# Patient Record
Sex: FEMALE | Race: WHITE | NOT HISPANIC OR LATINO | Employment: UNEMPLOYED | URBAN - METROPOLITAN AREA
[De-identification: names, ages, dates, MRNs, and addresses within clinical notes are randomized per-mention and may not be internally consistent; named-entity substitution may affect disease eponyms.]

---

## 2017-09-01 ENCOUNTER — TELEPHONE (OUTPATIENT)
Dept: NEUROLOGY | Facility: CLINIC | Age: 52
End: 2017-09-01

## 2017-09-01 NOTE — TELEPHONE ENCOUNTER
----- Message from Caden Tomlinson sent at 8/31/2017  1:00 PM CDT -----  Contact: self   [phone]  Patient has medicaid insurance, requesting to schedule an appointment for pain management. No future appointments.

## 2017-09-21 ENCOUNTER — TELEPHONE (OUTPATIENT)
Dept: NEUROLOGY | Facility: CLINIC | Age: 52
End: 2017-09-21

## 2017-09-21 NOTE — TELEPHONE ENCOUNTER
Called and spoke with patient. Patient has Medicaid. Numbers given to [facility]. Patient verbalized understanding.

## 2019-09-19 PROBLEM — Z12.11 SCREEN FOR COLON CANCER: Status: ACTIVE | Noted: 2019-09-19

## 2021-07-09 ENCOUNTER — IMMUNIZATION (OUTPATIENT)
Dept: PHARMACY | Facility: CLINIC | Age: 56
End: 2021-07-09

## 2021-07-09 DIAGNOSIS — Z23 NEED FOR VACCINATION: Primary | ICD-10-CM

## 2023-10-30 ENCOUNTER — DOCUMENTATION ONLY (OUTPATIENT)
Dept: SURGERY | Facility: CLINIC | Age: 58
End: 2023-10-30
Payer: COMMERCIAL

## 2023-10-30 DIAGNOSIS — C50.912 INVASIVE LOBULAR CARCINOMA OF LEFT BREAST IN FEMALE: Primary | ICD-10-CM

## 2023-10-30 DIAGNOSIS — Z91.89 AT RISK FOR LYMPHEDEMA: ICD-10-CM

## 2023-10-30 NOTE — PROGRESS NOTES
Met with the patient to discuss her new diagnosis and what she has learned thus far. All questions and concerns addressed.  Follow-up appts discussed and a folder with NCCN patient guidelines book on Invasive Breast Cancer given to her. Also given a copy of the [facility] Board of Medical Examiners brochure on Introductory Guide to Breast Cancer Treatment Options. Detailed information was discussed with the patient on Integrative Oncology services offered, support groups and classes, genetics and lymphedema management. Written copies were provided as well. Contact information given to her for nurse navigation and she was told to call for any questions or concerns. She verbalized understanding.   The following was explained in detail. The patient understands that this is a voluntary test.    PURPOSE: This test analyzes a specific gene or genes for genetic changes called mutations. This test will help determine if a person has a significantly increased risk if developing certain tumors due to a mutation in a cancer predisposing gene.    TEST RESULTS & INTERPRETATION: Possible result outcomes include positive, negative, and uncertain. A positive mutation is associated with an increased risk for hereditary cancer. A negative result is interpreted that a mutation was not identified. Uncertain means a genetic change was detected but it is not known if this change is linked to cancer risk.    BENEFITS: The benefits include informed choices about health care such as screening, risk reducing surgeries and preventive medication strategies.    RISKS: Concerns regarding possible health insurance discrimination, most states and the federal government have enacted laws to prohibit genetic discrimination.    LIMITATIONS: This test analyzes only certain important genes associated with a specific hereditary cancer syndrome. Genetic testing clarifies cancer risks for only those cancers related to the genes analyzed.    FINANCIAL  RESPONSIBILITY: Genetic testing of appropriate individuals is typically reimbursed by health insurance. You are responsible for any cost of the genetic test not reimbursed by insurance.     PATIENT INSTRUCTIONS & COLLECTION PROCESS:  Saliva collected.  Place tube into plastic box and sent off through [employer].  Informed patient results can take up to 2-4 weeks.  Will call patient with results.

## 2023-10-31 ENCOUNTER — TELEPHONE (OUTPATIENT)
Dept: HEMATOLOGY/ONCOLOGY | Facility: CLINIC | Age: 58
End: 2023-10-31
Payer: COMMERCIAL

## 2023-11-01 ENCOUNTER — TELEPHONE (OUTPATIENT)
Dept: HEMATOLOGY/ONCOLOGY | Facility: CLINIC | Age: 58
End: 2023-11-01
Payer: COMMERCIAL

## 2023-11-01 NOTE — TELEPHONE ENCOUNTER
I spoke with the patient about getting an IO appt scheduled per referral. I explained in detail what we offer and listed some symptoms/side effects that we can help address using our support services. They verbalized understanding and accepted a date/time. Location was reviewed.

## 2023-11-06 ENCOUNTER — TELEPHONE (OUTPATIENT)
Dept: HEMATOLOGY/ONCOLOGY | Facility: CLINIC | Age: 58
End: 2023-11-06
Payer: COMMERCIAL

## 2023-11-06 ENCOUNTER — TELEPHONE (OUTPATIENT)
Dept: SURGERY | Facility: CLINIC | Age: 58
End: 2023-11-06
Payer: COMMERCIAL

## 2023-11-06 NOTE — TELEPHONE ENCOUNTER
Spoke with the patient and confirmed IO appt tomorrow. Informed patient of Breast Cancer Support group tomorrow.

## 2023-11-06 NOTE — TELEPHONE ENCOUNTER
Called and told patient that her MRI did not show any additional findings and her receptors results were as expected, planning to move forward with surgery as expected

## 2023-11-07 ENCOUNTER — OFFICE VISIT (OUTPATIENT)
Dept: HEMATOLOGY/ONCOLOGY | Facility: CLINIC | Age: 58
End: 2023-11-07
Payer: COMMERCIAL

## 2023-11-07 VITALS
HEART RATE: 99 BPM | DIASTOLIC BLOOD PRESSURE: 73 MMHG | HEIGHT: 66 IN | SYSTOLIC BLOOD PRESSURE: 150 MMHG | OXYGEN SATURATION: 97 % | WEIGHT: 118.88 LBS | TEMPERATURE: 98 F | BODY MASS INDEX: 19.1 KG/M2

## 2023-11-07 DIAGNOSIS — R53.83 FATIGUE, UNSPECIFIED TYPE: ICD-10-CM

## 2023-11-07 DIAGNOSIS — M25.552 BILATERAL HIP PAIN: ICD-10-CM

## 2023-11-07 DIAGNOSIS — C50.912 INVASIVE LOBULAR CARCINOMA OF LEFT BREAST IN FEMALE: ICD-10-CM

## 2023-11-07 DIAGNOSIS — F41.9 ANXIETY: Primary | ICD-10-CM

## 2023-11-07 DIAGNOSIS — H93.13 TINNITUS OF BOTH EARS: ICD-10-CM

## 2023-11-07 DIAGNOSIS — M54.50 CHRONIC LOW BACK PAIN, UNSPECIFIED BACK PAIN LATERALITY, UNSPECIFIED WHETHER SCIATICA PRESENT: ICD-10-CM

## 2023-11-07 DIAGNOSIS — M25.551 BILATERAL HIP PAIN: ICD-10-CM

## 2023-11-07 DIAGNOSIS — R11.0 NAUSEA: ICD-10-CM

## 2023-11-07 DIAGNOSIS — G89.29 CHRONIC LOW BACK PAIN, UNSPECIFIED BACK PAIN LATERALITY, UNSPECIFIED WHETHER SCIATICA PRESENT: ICD-10-CM

## 2023-11-07 DIAGNOSIS — G47.00 INSOMNIA, UNSPECIFIED TYPE: ICD-10-CM

## 2023-11-07 PROCEDURE — 99999 PR PBB SHADOW E&M-EST. PATIENT-LVL V: ICD-10-PCS | Mod: PBBFAC,,, | Performed by: NURSE PRACTITIONER

## 2023-11-07 PROCEDURE — 99999 PR PBB SHADOW E&M-EST. PATIENT-LVL V: CPT | Mod: PBBFAC,,, | Performed by: NURSE PRACTITIONER

## 2023-11-07 PROCEDURE — 99205 OFFICE O/P NEW HI 60 MIN: CPT | Mod: S$GLB,,, | Performed by: NURSE PRACTITIONER

## 2023-11-07 PROCEDURE — 99205 PR OFFICE/OUTPT VISIT, NEW, LEVL V, 60-74 MIN: ICD-10-PCS | Mod: S$GLB,,, | Performed by: NURSE PRACTITIONER

## 2023-11-07 NOTE — PROGRESS NOTES
"Sheri Garces  58 y.o. is here to seek an integrative approach to discuss side effects related to breast cancer treatment. Sehri Garces was referred by Dr. Leon     HPI  Mrs. Garces was diagnosed with breast cancer after a routine mammogram. She is going for a lumpectomy and then will have radiation. She reports she does not sleep well and is "always tired." She feels like any little thing she does makes her feel like she is going to collapse. She has ringing in her ears for the last 3 years. The only time she gets a break is when she is sleeping and the ringing startles her every morning when she wakes up. She reports increased anxiety the last few years and states, " a lot has been going on." She also reports her uncle recently  of lung cancer. She is concerned about her children as her daughters father has stage 4 colon cancer.   She has a good appetite, but her stomach has been upset and she has been constipated so it is hard to eat. She started Miralax 4 days ago. She also reports her stomach has been burning and when she eats she gets nausea. She previously had online counseling but has not had counseling in a year. She walks her dog 20 minutes a day, but states it is getting difficult due to her leg and hip pain. 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 She had major back surgery in  and continues to have low back pain.   She has been  for 22 years and she has 2 children. She is a retired LPN. She reports her Dad also has cancer. She has a supportive .     Pillars Assessment    Sleep  How many hours of sleep per night? 5 hours  Do you have trouble falling asleep, staying asleep or waking up earlier than you need to? yes  Do you have daytime fatigue? yes  Do you need medication for sleep? yes Lunesta  Do you use any supplements or other interventions for sleep? no    Resilience  Rate your current level of stress- high  How do you manage stress?  " Music, journaling, smoking, Dr. Pepper    Environment  Any exposures: smoking    Spirituality-  Amish. She is working on her connection with God.     Nutrition   Food allergies or sensitivities: yes lactose  Do you adhere to a particular type of diet? no  Do you have any concerns with your eating habits? yes    Exercise  How would you describe your physical activity level? low  What do you do for physical activity? walks    Past Medical History  Past Medical History:   Diagnosis Date    Anxiety     Bursitis of both hips     Chronic back pain     Chronic leg pain     left    Depression     Hyperlipidemia     Hypertension     Insomnia     Miscarriage     Sleep apnea     Tinnitus       Past Surgical History   Past Surgical History:   Procedure Laterality Date    BACK SURGERY      17 years ago    COLONOSCOPY N/A 9/19/2019    Procedure: COLONOSCOPY;  Surgeon: Andrea Watt MD;  Location: [facility];  Service: Endoscopy;  Laterality: N/A;    DILATION AND CURETTAGE OF UTERUS        Family History   Family History   Problem Relation Age of Onset    Heart disease Mother     Cancer Father     Diabetes Father     Prostate cancer Father 80    Bone cancer Father 87    Cancer Paternal Uncle         prostate    Lung cancer Paternal Uncle     Leukemia Paternal Grandfather 70      Allergies  Review of patient's allergies indicates:   Allergen Reactions    Demerol [meperidine]       Current Medications:    Current Outpatient Medications:     ALPRAZolam (XANAX) 0.25 MG tablet, Take 1 tablet (0.25 mg total) by mouth 2 (two) times daily as needed for Anxiety., Disp: 30 tablet, Rfl: 0    atorvastatin (LIPITOR) 40 MG tablet, Take 40 mg by mouth once daily., Disp: , Rfl:     diphenhydrAMINE (BENADRYL) 25 mg capsule, Take 25 mg by mouth every 6 (six) hours as needed for Itching., Disp: , Rfl:     eszopiclone 3 mg Tab, Take 3 mg by mouth every evening., Disp: , Rfl:     HYDROcodone-acetaminophen (NORCO)  mg per tablet, Take 1  tablet by mouth every 6 (six) hours as needed for Pain., Disp: 18 tablet, Rfl: 0    lorazepam (ATIVAN) 1 MG tablet, Take 0.5 mg by mouth as needed for Anxiety. , Disp: , Rfl:     losartan (COZAAR) 25 MG tablet, Take 25 mg by mouth once daily., Disp: , Rfl:     methylPREDNISolone (MEDROL DOSEPACK) 4 mg tablet, TAKE AS DIRECTED PER PACKAGE, Disp: , Rfl:     naproxen (NAPROSYN) 500 MG tablet, Take 1 tablet (500 mg total) by mouth 2 (two) times daily with meals. (Patient not taking: Reported on 10/20/2023), Disp: 20 tablet, Rfl: 0    ondansetron (ZOFRAN ODT) 4 MG TbDL, Take 1 tablet (4 mg total) by mouth every 6 (six) hours as needed., Disp: 10 tablet, Rfl: 0    ondansetron (ZOFRAN) 4 MG tablet, Take 1 tablet (4 mg total) by mouth every 8 (eight) hours as needed for Nausea., Disp: 15 tablet, Rfl: 0    ondansetron (ZOFRAN) 8 MG tablet, Take 8 mg by mouth 2 (two) times daily., Disp: , Rfl:     pantoprazole (PROTONIX) 20 MG tablet, Take 1 tablet (20 mg total) by mouth once daily., Disp: 30 tablet, Rfl: 0    propranolol (INDERAL) 10 MG tablet, Take 10 mg by mouth 3 (three) times daily., Disp: , Rfl:     sertraline (ZOLOFT) 100 MG tablet, Take 50 mg by mouth once daily., Disp: , Rfl:     venlafaxine (EFFEXOR) 100 MG Tab, Take 150 mg by mouth 2 (two) times daily., Disp: , Rfl:      Review of Systems  Review of Systems   Constitutional:  Positive for malaise/fatigue.   HENT:  Positive for tinnitus.    Eyes: Negative.    Respiratory: Negative.     Cardiovascular: Negative.    Gastrointestinal:  Positive for constipation and nausea.   Genitourinary: Negative.    Musculoskeletal:  Positive for back pain and joint pain.   Skin: Negative.    Neurological: Negative.    Endo/Heme/Allergies: Negative.    Psychiatric/Behavioral:  Positive for depression. The patient is nervous/anxious and has insomnia.         Physical Exam      Vitals:    11/07/23 1308   BP: (!) 150/73   Pulse: 99   Temp: 97.5 °F (36.4 °C)   TempSrc: Temporal   SpO2:  "97%   Weight: 53.9 kg (118 lb 14.4 oz)   Height: 5' 6" (1.676 m)     Body mass index is 19.19 kg/m².  Physical Exam  Vitals reviewed.   Constitutional:       Appearance: Normal appearance.   Neurological:      Mental Status: She is alert.   Psychiatric:         Mood and Affect: Mood normal.         Behavior: Behavior normal.          ASSESSMENT :  1. Anxiety    2. Chronic low back pain, unspecified back pain laterality, unspecified whether sciatica present    3. Bilateral hip pain    4. Fatigue, unspecified type    5. Insomnia, unspecified type    6. Nausea    7. Tinnitus of both ears    8. Invasive lobular carcinoma of left breast in female         PLAN:  Reviewed all information discussed at today's visit and all questions were answered.    Counseled on healthy lifestyle and behavior modifications   Prehab PT scheduled  Counseled on sleep hygiene: Recommended insight timer  Recommended tummy drops to help with nausea  Referral to Oncology Psychology  Referral to Acupuncture  I explained acupuncture can reduce fatigue, pain, and neuropathy. It can also assist with behavioral health such as depression and anxiety and improve overall sleep quality. Acupuncture helps improve overall symptoms from treatment.   I discussed and recommended the following support services:  Andrew Chi and Yoga I suggested Andrew Chi and/or Yoga as these practices reduce stress, increases flexibility and muscle strength, improves balance and promotes serenity in the power of movement to help fight disease and boost your immune system.   Music and relaxation therapy and Meditation which can decrease stress by lowering blood pressure, slowing breathing, and helping you be more present in the moment. It improves sleep by relaxing the body and mind at the end of the day.Meditation also trains you how to focus on one thing at a time, improving concentration. It also promotes emotional well-being by decreasing depression and anxiety, and helping create " a more positive outlook on life.    Follow up with Integrative Services in 3 months      I spent a total of 64 minutes on the day of the visit.This includes face to face time and non-face to face time preparing to see the patient (eg, review of tests), obtaining and/or reviewing separately obtained history, documenting clinical information in the electronic or other health record, independently interpreting results and communicating results to the patient/family/caregiver, or care coordinator.

## 2023-11-08 ENCOUNTER — TELEPHONE (OUTPATIENT)
Dept: PSYCHIATRY | Facility: CLINIC | Age: 58
End: 2023-11-08
Payer: COMMERCIAL

## 2023-11-09 ENCOUNTER — TELEPHONE (OUTPATIENT)
Dept: HEMATOLOGY/ONCOLOGY | Facility: CLINIC | Age: 58
End: 2023-11-09
Payer: COMMERCIAL

## 2023-11-09 NOTE — NURSING
Received referral.  Spoke with pt.  Scheduled post op consult with Dr. Gilmore on 12/13/23.  Location and contact information reviewed.  Asked her to call with any questions or concerns.  She thanked me and verbalized understanding.

## 2023-11-13 ENCOUNTER — TUMOR BOARD CONFERENCE (OUTPATIENT)
Dept: SURGERY | Facility: CLINIC | Age: 58
End: 2023-11-13
Payer: COMMERCIAL

## 2023-11-15 NOTE — PROGRESS NOTES
Interdisciplinary Breast Cancer Conference    Sheri Garces  Female    Date Presented to Tumor Board: 11/13/23  Presenting Hospital / Clinic: [facility] [place]    Tumor Laterality: Left  Breast Tumor Site: UOQ    Presenter: Dr Akanksha Leon  Reason for Consultation: Initial Presentation    Specialties Present: Medical Oncology;Hematology;Radiation Oncology;Surgical Oncology;Pathology;Navigation;Plastic Surgery    Patient Status: a new patient  Treatment to Date: None  Clinical Trial Eligibility: None available    Cancer Staging   Invasive lobular carcinoma of left breast in female  Staging form: Breast, AJCC 8th Edition  - Clinical stage from 10/30/2023: Stage IA (cT1a, cN0, cM0, G1, ER+, NV+, HER2-) - Signed by Akanksha Leon MD on 11/4/2023      Recommended Therapy:  Genetic testing  Surgery- Left Lia lump. Left SLNB  Final recommendations pending surgical  path   Genomic testing if greater than 5mm  Radiation   Endocrine Therapy        Metastatic Site(s): None    Presentation at Cancer Conference: Prospective

## 2023-11-20 ENCOUNTER — CLINICAL SUPPORT (OUTPATIENT)
Dept: REHABILITATION | Facility: HOSPITAL | Age: 58
End: 2023-11-20
Payer: COMMERCIAL

## 2023-11-20 ENCOUNTER — OFFICE VISIT (OUTPATIENT)
Dept: RADIATION ONCOLOGY | Facility: CLINIC | Age: 58
End: 2023-11-20
Payer: COMMERCIAL

## 2023-11-20 VITALS
OXYGEN SATURATION: 96 % | HEIGHT: 66 IN | SYSTOLIC BLOOD PRESSURE: 138 MMHG | WEIGHT: 118.81 LBS | RESPIRATION RATE: 18 BRPM | TEMPERATURE: 98 F | DIASTOLIC BLOOD PRESSURE: 81 MMHG | HEART RATE: 88 BPM | BODY MASS INDEX: 19.09 KG/M2

## 2023-11-20 DIAGNOSIS — C50.912 INVASIVE LOBULAR CARCINOMA OF LEFT BREAST IN FEMALE: ICD-10-CM

## 2023-11-20 DIAGNOSIS — Z91.89 AT RISK FOR LYMPHEDEMA: ICD-10-CM

## 2023-11-20 DIAGNOSIS — L60.3 NAIL DYSTROPHY: ICD-10-CM

## 2023-11-20 DIAGNOSIS — F17.200 TOBACCO USE DISORDER: ICD-10-CM

## 2023-11-20 DIAGNOSIS — Z72.0 TOBACCO ABUSE: Primary | ICD-10-CM

## 2023-11-20 PROCEDURE — 99205 PR OFFICE/OUTPT VISIT, NEW, LEVL V, 60-74 MIN: ICD-10-PCS | Mod: S$GLB,,, | Performed by: RADIOLOGY

## 2023-11-20 PROCEDURE — 99205 OFFICE O/P NEW HI 60 MIN: CPT | Mod: S$GLB,,, | Performed by: RADIOLOGY

## 2023-11-20 PROCEDURE — 99999 PR PBB SHADOW E&M-EST. PATIENT-LVL V: CPT | Mod: PBBFAC,,, | Performed by: RADIOLOGY

## 2023-11-20 PROCEDURE — 99999 PR PBB SHADOW E&M-EST. PATIENT-LVL V: ICD-10-PCS | Mod: PBBFAC,,, | Performed by: RADIOLOGY

## 2023-11-20 PROCEDURE — 97162 PT EVAL MOD COMPLEX 30 MIN: CPT | Mod: PN

## 2023-11-20 NOTE — PROGRESS NOTES
11/20/2023    [facility]   Radiation Oncology Consultation    ASSESSMENT  This is a 58 y.o. y/o female with Stage IA (cT1a, N0, M0, Grade 1, ER+/KS+/HER2-) Invasive lobular carcinoma of the left breast. She is seen  prior to initiation of therapy for discussion of treatment options.       PLAN    Treatment options were discussed with the patient including mastectomy vs breast conservation with lumpectomy and adjuvant RT.  We discussed the goals of treatment to be curative.  The risks, benefits, scheduling, alternatives to and rationale of radiation therapy were explained in detail.   We discussed the indications, course, and potential risks associated with radiation therapy, including but not limited to fatigue, local skin reaction such as hyperpigmentation, tenderness or erythema, breast pain, and potential long term toxicities such as rib fragility, and remote risks of lung inflammation, esophageal inflammation, heart inflammation, or secondary malignancy.  She expressed understanding of these risks, all questions were answered to her apparent satisfaction.   After this discussion, she elected to proceed with breast conservation.    A CT simulation will be performed on 1/4/24 to begin the planning process for the patient's radiation therapy.     Referral to Dermatology for nail dystrophy  Low Dose Lung Screener  Smoking cessation counseling   She was given our contact information, and she was told that she could call our clinic at any time if she has any questions or concerns.      Radiation Treatment Details:   We plan to treat partial or whole left breast to a dose of 30 Gy or 26 Gy in 5 fractions at 6 or 5.2 Gy per fraction delivered daily or every other day  We will utilize a(n) 3D or IMRT technique.   We will utilize daily CT or orthogonal image guidance due to the need for accurate daily patient alignment to treat the target volumes accurately and avoid radiation overdose  to multiple regional organs at risk since we are treating the patient with complex target volumes with multiple steep isodose gradients.       Chief Complaint   Patient presents with    Breast Cancer       HPI: The patient is a 58 y.o. year old female with a new diagnosis of breast cancer. She initially presented due to abnormal mammogram.     10/16/23 Screening mammogram:  left 7mm focal asymmetry UOQ    10/20/23 Diagnostic left mammogram/ultrasound:   Mammogram: asymmetry presists, associated architectural distortion; also asymmetry in central breast  U/S: 5 x 5 x 3mm irregularly shaped hypoechoic mass with indistinct margins in left 1:00    10/24/23 Left UOQ biopsy: in situ and invasive lobular carcinoma, low grade, +/+/-, Ki67 2%  Central breast biopsy: benign    10/31/23 MRI Breasts: 7 x 4 x 4mm below threshold enhancement surrounding left upper outer quadrant biopsy clip, middle depth; no suspicious lymph nodes     Plan for lumpectomy 11/28/23.  She presents today to discuss adjuvant radiation therapy as part of breast conserving therapy.    Possibility of pregnancy: No  History of prior irradiation: No  History of prior systemic anti-cancer therapy: No  History of collagen vascular disease: No  Implanted electronic device (pacer/defib/nerve stimulator): No  Titanium cage in L/S spine 2/2 spondylolisthesis,     Past Medical History:   Diagnosis Date    Anxiety     Bursitis of both hips     Chronic back pain     Chronic leg pain     left    Depression     Hyperlipidemia     Hypertension     Insomnia     Miscarriage     Sleep apnea     Tinnitus        Past Surgical History:   Procedure Laterality Date    BACK SURGERY      17 years ago    COLONOSCOPY N/A 9/19/2019    Procedure: COLONOSCOPY;  Surgeon: Andrea Watt MD;  Location: [facility];  Service: Endoscopy;  Laterality: N/A;    DILATION AND CURETTAGE OF UTERUS         Social History     Tobacco Use    Smoking status: Every Day     Current packs/day: 1.00     " Types: Cigarettes    Smokeless tobacco: Former   Substance Use Topics    Alcohol use: No    Drug use: Not Currently       Cancer-related family history includes Bone cancer (age of onset: 87) in her father; Cancer in her father and paternal uncle; Lung cancer in her paternal uncle; Prostate cancer (age of onset: 80) in her father.    Current Outpatient Medications on File Prior to Visit   Medication Sig Dispense Refill    ALPRAZolam (XANAX) 0.25 MG tablet Take 1 tablet (0.25 mg total) by mouth 2 (two) times daily as needed for Anxiety. 30 tablet 0    atorvastatin (LIPITOR) 40 MG tablet Take 40 mg by mouth once daily.      diphenhydrAMINE (BENADRYL) 25 mg capsule Take 25 mg by mouth every 6 (six) hours as needed for Itching.      eszopiclone 3 mg Tab Take 3 mg by mouth every evening.      HYDROcodone-acetaminophen (NORCO)  mg per tablet Take 1 tablet by mouth every 6 (six) hours as needed for Pain. 18 tablet 0    lorazepam (ATIVAN) 1 MG tablet Take 0.5 mg by mouth as needed for Anxiety.       losartan (COZAAR) 25 MG tablet Take 25 mg by mouth once daily.      methylPREDNISolone (MEDROL DOSEPACK) 4 mg tablet TAKE AS DIRECTED PER PACKAGE      ondansetron (ZOFRAN ODT) 4 MG TbDL Take 1 tablet (4 mg total) by mouth every 6 (six) hours as needed. 10 tablet 0    ondansetron (ZOFRAN) 8 MG tablet Take 8 mg by mouth 2 (two) times daily.      propranolol (INDERAL) 10 MG tablet Take 10 mg by mouth 3 (three) times daily.      sertraline (ZOLOFT) 100 MG tablet Take 50 mg by mouth once daily.      venlafaxine (EFFEXOR) 100 MG Tab Take 150 mg by mouth 2 (two) times daily.       No current facility-administered medications on file prior to visit.       Review of patient's allergies indicates:   Allergen Reactions    Demerol [meperidine]        ROS     Vital Signs: /81 (BP Location: Left arm, Patient Position: Sitting)   Pulse 88   Temp 97.7 °F (36.5 °C)   Resp 18   Ht 5' 6" (1.676 m)   Wt 53.9 kg (118 lb 13.3 oz)   " SpO2 96%   BMI 19.18 kg/m²     ECOG Performance Status: 0 - Fully Active    Physical Exam  Chest:   Breasts:     Breasts are symmetrical.      Right: Normal.      Left: Mass (post-biopsy 2cm firm region) present.       Lymphadenopathy:      Upper Body:      Right upper body: No supraclavicular, axillary or pectoral adenopathy.      Left upper body: No supraclavicular, axillary or pectoral adenopathy.   Skin:     Nails: There is clubbing.      Comments: Absent right 5th and 2nd fingernail- pt states fell off without trauma spontaneously.  Thickened nails            Imaging: I have personally reviewed the patient's available images and reports and summarized pertinent findings above in HPI.     Pathology: I have personally reviewed the patient's available pathology and summarized pertinent findings above in HPI.     This case was discussed with Dr. Leon        - Thank you for allowing me to participate in the care of your patient.    Tamar Brady MD

## 2023-11-20 NOTE — PATIENT INSTRUCTIONS
Garment Recommendations: Sigvaris Secure Lite upper arm sleeve with silicone band at top, in size S2 ( small, long length) in 15-20 mmHg. Sigvaris Secure Lite gauntlet in size small with 15-20 mmHg. Please wear these garments beginning at 2 weeks after surgery, for one year pro-phylactically during the day.   Full axillary coverage in bra choices are recommended.  Please contact the insurance to see if they will cover it. If it is not, you can obtain online, or in person at a local DME. PT will get orders placed in your chart.   Please contact  if it is a financial hardship. ( [facility])       Post Operative Breast Cancer Surgery Exercises    After surgery your shoulder and chest may feel tight and sore. Movement may cause stretching across your chest, axilla (armpit), and the incision site limiting your ability to raise your arm. Exercise will be extremely important in preventing swelling and in helping you regain movement, strength, and use of your arm.    Garment recommendations:       Your post-operative exercise program can be divided into three stages. Your surgeon will inform you when to move to the next stage.     STAGE TIME PURPOSE EXERCISE   I Post-op day 1 to 4  (Drains are in) To prevent and/or reduce swelling - Positioning  - Hand and arm precautions   II Post-op day 5 to 14  (After drains are removed) To provide gentle movement without much stretching  - Shoulder shrugs  - Shoulder retraction   III Post-op day 15-6 wks  (After suture are removed) To stretch and regain full motion - Wall ladder  - Range of motion exercises  - Wand exercises       These exercises are general guideline for use following a mastectomy. Please consult your physician for additional information. Of a tissue expander has been placed, or if you have had reconstruction, you must get approval from your physician before beginning exercises.   Check with your physician prior to beginning a new  stage.  Avoid elbows above shoulders until after post-op day 14.    STAGE 1      Abdominal Breathing Exercises      Get comfortable and relax your neck and shoulders. You can sit or lie down. Place one hand on your upper chest and place the other hand on your belly button. Use your hands to feel the movements as you breathe in and out.  Take a deep breath in through your nose and feel the hand on your stomach move out. Do not let your shoulders move up. The hand on your chest should not move.   Breathe out slow and gentle through your mouth. Pucker your lips as if you were going to whistle or blow out a candle. The hand on your stomach should move in as you breathe out. Breathe out as long as you can until all the air is gone.   To help keep the lymphatic system moving well, practice two breaths every hour using the steps for abdominal breathing exercises.        Positioning    Several times a day, elevate your arm on pillows so your hand is above the level of your heart. This position will allow gravity to drain excess fluids out of your hand and arm. Try to place pillows under involved arm while in sitting position or while laying down.                STAGE 2    Shoulder Shrugs Shoulder Retraction    While sitting with arms supported, shrug both of your shoulder and then relax. Repeat 10 times, 3 times a day.   While sitting or standing, pull both shoulder back, bringing shoulder blades together. Repeat 10 times,   3 times a day.        STAGE 3  Range of Motion Exercises go to stretch not to pain    To retain full motion of your shoulder, it must be moved in all planes, several times a day. Begin arm range of motion exercises with 10 reps of each, 2 times a day. Progress to 3 x 10 reps, as tolerated 2 times a day.    Wand - Shoulder Flexion       Lay on your back in a comfortable position. Raise both arms overhead, then bring back down by your side.        Wand - Shoulder Abduction       Lay on your back in a  comfortable position. Raise both arms out to your side, then bring back down by your side.                 Wall Climbing - Shoulder Flexion      Stand facing the wall and extend the involved arm directly in front of you so that your fingertips touch the wall (at arm's length away from the wall). Creep up the side of the wall with your fingertips, taking a step toward the wall as you reach higher and higher up the wall. Repeat this procedure going down the wall, but taking a step backward this time. Begin with 5 wall climbs, then progress to 10 reps at a time, 1-2 times a day.        Wall Climbing - Shoulder Abduction     ([email or web address]  home-exercises-for-frozen-shoulder/) Repeat the above procedure, but this time position yourself perpendicular (at a right angle) to the wall so that the involved arm will extend sideways up the wall. Keep your trunk straight, not leaning toward the wall or shrugging your shoulder. Place a kenzie (tape) on the wall each week to see if you are making progress. Begin with 5 wall climbs, then progress to 10 reps at a time, 1-2 times a day.        PRECAUTIONS    As a result of the removal of lymph nodes and vessels, your arm is susceptible to infection and swelling. A hot, reddened or swollen arm denotes the presence of infection and should be brought to the attention of your physician immediately. Try to avoid cuts, scratches, pinpricks, hangnails, sunburns, insect bites, chemical irritation, and irritating detergent.    The following are helpful hints:    Avoid injections, blood draws, blood pressure, and IVs to be done to your involved arm. If you have undergone axillary dissection, then consider using the opposite only for injections, blood draws, blood pressure, and IVs.  Prevent chapping of your hand and arm by applying lotion liberally several times a day. Recommend Eucerin lotion, No massages to affected upper extremity if you have had lymph nodes dissection  or radiation to lymph nodes.   Do not cut nails too close to the quick. Do not cut or pick your cuticles. Use cuticle cream or remover.  Avoid carrying heavy objects (over 5 lbs) with your involved arm.   Protect your arm from burns with small electrical appliances such as irons, curling irons, and frying pans.   Wear sunscreen in the sun.  Apply insect repellent when going to areas where you might be exposed to insect bites.   Use an electric razor for shaving.   Wear loose fitting work/rubber gloves when washing dishes, using , or using steel wool.  Wear garden gloves when gardening or arranging thorn flowers.  Do not wear tight jewelry on your affected arm.  Do not wear narrow tight straps on clothing or under garments.    IMPORTANT    If you do cut, burn, or acevedo the skin, wash the area and use an antiseptic. If it becomes red, warm, or unusually hard or swollen, contact your physician immediately.     If there is swelling without redness, increased warmth or hardness, the positioning and pumping exercises as described above can help decrease the swelling. Position your hand higher than the elbow, elbow higher than the shoulder, and shoulder higher than your heart. Maintain this position for 30 minutes. Repeat as necessary.     OCCUPATIONAL AND PHYSICAL THERAPY    Most women have enough motion in their involved arm to perform normal activities within 2 months after surgery. Any post-operative swelling or pain has probably disappeared. However; some women may develop persistent stiffness, weakness, pain, or swelling. If this is your experience, call your physician. He or she may recommend that a physical or occupational therapist work with you to minimize your problems.     CONCLUSION    Besides your exercise program, your daily routine at home can be continued and will be beneficial toward your recovery:  Getting dressed every day  Daily grooming  Cooking and light housework  Being active with family  and friends    REMINDER  Pace yourself!  Strive for a balance between work and relaxation  Avoid excessive pulling and lifting which may strain your shoulder        Current research advocates for 150 min a week of moderate aerobic intensity exercise coupled with 2 days a week of resistive exercises for all major muscle groups 12-15 reps. We will cover this on your post op visit.

## 2023-11-21 ENCOUNTER — TELEPHONE (OUTPATIENT)
Dept: HEMATOLOGY/ONCOLOGY | Facility: CLINIC | Age: 58
End: 2023-11-21
Payer: COMMERCIAL

## 2023-11-21 NOTE — TELEPHONE ENCOUNTER
Returned call to pt, she was calling to let the PT provider Delaney know that her insurance will cover her sleeve and wants to know the ordering process. I told her that I will send a message to the provider to reach out to her so that they can get her compression garment ordered.    ----- Message from Brenda Davis, Patient Care Assistant sent at 11/21/2023 10:13 AM CST -----  Type: Needs Medical Advice  Who Called:  Sheri  Dwight Call Back Number: [phone]    Additional Information: Sheri is wanting to speak to someone about coverage about her insurance and if  the arm sleeve is covered, please call to further discuss ,thank you .

## 2023-12-04 ENCOUNTER — TELEPHONE (OUTPATIENT)
Dept: HEMATOLOGY/ONCOLOGY | Facility: CLINIC | Age: 58
End: 2023-12-04
Payer: COMMERCIAL

## 2023-12-04 ENCOUNTER — PATIENT MESSAGE (OUTPATIENT)
Dept: HEMATOLOGY/ONCOLOGY | Facility: CLINIC | Age: 58
End: 2023-12-04
Payer: COMMERCIAL

## 2023-12-04 NOTE — TELEPHONE ENCOUNTER
LVM to discuss.       ----- Message from ЮЛИЯ Vidal sent at 12/4/2023  2:53 PM CST -----  Regarding: RE: acupuncture  Her insurance denied acupuncture. Bayron, can you pls call her and see if she would like to do cheng pay?  Nikhil  ----- Message -----  From: Jaycee Ferro RN  Sent: 12/4/2023   2:49 PM CST  To: ЮЛИЯ Vidal; Bayron Mejia MA; #  Subject: acupuncture                                      She would like to get set up with acupuncture please. Thank you       
Yes

## 2023-12-08 ENCOUNTER — TELEPHONE (OUTPATIENT)
Dept: PSYCHIATRY | Facility: CLINIC | Age: 58
End: 2023-12-08
Payer: COMMERCIAL

## 2023-12-08 ENCOUNTER — TELEPHONE (OUTPATIENT)
Dept: HEMATOLOGY/ONCOLOGY | Facility: CLINIC | Age: 58
End: 2023-12-08
Payer: COMMERCIAL

## 2023-12-08 NOTE — TELEPHONE ENCOUNTER
Called patient to reschedule her appointment on 12-11. Patient didn't answer left a voice mail for patient to call back to reschedule.

## 2023-12-08 NOTE — NURSING
Oncotype not back yet.  Cancelled appt with Dr. Gilmore.  Will call pt once oncotype is resulted to schedule.  Asked her to call me with any questions or concerns.  She thanked me and verbalized understanding.

## 2023-12-11 ENCOUNTER — TELEPHONE (OUTPATIENT)
Dept: PSYCHIATRY | Facility: CLINIC | Age: 58
End: 2023-12-11
Payer: COMMERCIAL

## 2023-12-11 ENCOUNTER — TELEPHONE (OUTPATIENT)
Dept: HEMATOLOGY/ONCOLOGY | Facility: CLINIC | Age: 58
End: 2023-12-11
Payer: COMMERCIAL

## 2023-12-11 NOTE — TELEPHONE ENCOUNTER
Spoke with the patient to schedule the first acupuncture appt. Insurance denied referral, but patient voiced she will schedule as self-pay for at least the first appt and will call insurance to check on coverage. They were made aware to arrive early to allow time for check in and complete paperwork. Location was reviewed.        ----- Message from Brenda Davis Patient Care Assistant sent at 12/11/2023  1:40 PM CST -----  Type:  Patient Returning Call    Who Called:  ulises  Who Left Message for Patient:  ramon  Does the patient know what this is regarding?:  acupuncture appointment  Best Call Back Number:  [phone]    Additional Information:  ulises is returning a call from ramon to schedule acupuncture appointment , please call to further discuss, thank you

## 2023-12-11 NOTE — TELEPHONE ENCOUNTER
BONITAM for pt to call us back to rusty. Psych appt with Dr Carcamo ----- Message from Starr Mejia RN sent at 12/8/2023  5:11 PM CST -----  Regarding: psych cancellation  Hey,    I called her about her appt with Dr. Gilmore and she is sick and asked me to cancel her appt for Monday at 8am with Dr. Brock.  You may want to call her and see if she is ready to reschedule.     Thanks,    Starr

## 2023-12-13 ENCOUNTER — CLINICAL SUPPORT (OUTPATIENT)
Dept: REHABILITATION | Facility: HOSPITAL | Age: 58
End: 2023-12-13
Payer: COMMERCIAL

## 2023-12-13 DIAGNOSIS — H93.13 TINNITUS OF BOTH EARS: ICD-10-CM

## 2023-12-13 DIAGNOSIS — G89.29 CHRONIC LOW BACK PAIN, UNSPECIFIED BACK PAIN LATERALITY, UNSPECIFIED WHETHER SCIATICA PRESENT: ICD-10-CM

## 2023-12-13 DIAGNOSIS — M54.50 CHRONIC LOW BACK PAIN, UNSPECIFIED BACK PAIN LATERALITY, UNSPECIFIED WHETHER SCIATICA PRESENT: ICD-10-CM

## 2023-12-13 DIAGNOSIS — F41.9 ANXIETY: Primary | ICD-10-CM

## 2023-12-13 PROBLEM — C50.412 MALIGNANT NEOPLASM OF UPPER-OUTER QUADRANT OF LEFT FEMALE BREAST: Status: ACTIVE | Noted: 2023-12-13

## 2023-12-13 PROCEDURE — 97811 ACUP 1/> W/O ESTIM EA ADD 15: CPT | Mod: PN | Performed by: ACUPUNCTURIST

## 2023-12-13 PROCEDURE — 97810 ACUP 1/> WO ESTIM 1ST 15 MIN: CPT | Mod: PN | Performed by: ACUPUNCTURIST

## 2023-12-15 ENCOUNTER — TELEPHONE (OUTPATIENT)
Dept: HEMATOLOGY/ONCOLOGY | Facility: CLINIC | Age: 58
End: 2023-12-15
Payer: COMMERCIAL

## 2023-12-15 ENCOUNTER — PATIENT MESSAGE (OUTPATIENT)
Dept: HEMATOLOGY/ONCOLOGY | Facility: CLINIC | Age: 58
End: 2023-12-15
Payer: COMMERCIAL

## 2023-12-15 NOTE — PROGRESS NOTES
"  Acupuncture Evaluation Note     Name: Sheri Garces  Clinic Number: [id number]    Traditional Chinese Medicine (TCM) Diagnosis: Qi Stagnation, Blood Stasis, Qi Deficiency, Blood Deficiency, Wind , and Damp  Medical Diagnosis:   1. Anxiety    2. Chronic low back pain, unspecified back pain laterality, unspecified whether sciatica present    3. Tinnitus of both ears         Evaluation Date: 12/13/2023  Visit #/Visits authorized:     Precautions: Standard    Subjective     Chief Concern: Low-back Pain (Low back pain is 6/10 today.), Anxiety (Anxiety today is 9/10), and Tinnitus (Bilateral idiopathic tinnitus. L > R.)        Medical necessity is demonstrated by the following IMPAIRMENTS: Medical Necessity: Decreased mobility limits day to day activities, social, and emergent situations              Aggravating Factors:  movement   Relieving Factors:  rest    Symptom Description:     Quality:  Aching, Dull, and Variable  Severity:  6  Frequency:  every day    Previous Treatments Tried:   medication      Digestion:     Diet: in general, a "healthy" diet     Fluids:  is drinking moderate amounts of fluids,       Sleep: 9/10 insomnia    Energy Levels:  8/10 fatigue - worse after covid, only had one vac.    Psychological Symptoms:  7/10 depression, 9/10 anxiety    Other Symptoms: 3/10 SOB, 4/10 Nausea, 7/10 dry mouth    GYN Symptoms: 0/10 hot flashes    Objective     Observation: Patient has an extensive medical history starting with spondylolisthesis of the lumbar spine which radiates into the left lower extremity.  Lump/lymph ectomy from CA surgery, mild right sided disarticulation of C 2-3 which may be contributing to her tinnitus, but to which she attributes it's arrival as being post covid. Other symptomology can be found above.      Pulse:        thready       New Findings:  na    Treatment     Treatment Principles:  move qi and blood, relieve bi, calm knott, harmonize middle cecile, nourish yin and clear channels. "     Acupuncture points used:  4 CHURCH, Du20, ER MILES, Gb34, Ki3, Ki6, Li11, Sp6, Sp9, St25, St36, and YIN MOBLEY    Bilateral points:  Unilateral points:  Auricular Treatment:  knott men    Needles In: 26  Needles Out: 26  Needles W/O STIM placed: 105  [place] W/O STIM removed: 125      Other Traditional Chinese Medicine Modalities -  tui na and acupressure    Assessment     After treatment, patient felt less anxious and plans to continue     Patient prognosis is Good.     Patient will continue to benefit from acupuncture treatment to address the deficits listed in the problem list box on initial evaluation, provide patient family education and to maximize pt's level of independence in the home and community environment.     Patient's spiritual, cultural and educational needs considered and pt agreeable to plan of care and goals.     Anticipated barriers to treatment: none    Plan     Recommend 1 /week for 12 sessions then re-assess.      Education:  Patient is aware of cumulative benefit of acupuncture

## 2023-12-19 ENCOUNTER — OFFICE VISIT (OUTPATIENT)
Dept: PSYCHIATRY | Facility: CLINIC | Age: 58
End: 2023-12-19
Payer: COMMERCIAL

## 2023-12-19 DIAGNOSIS — F33.1 MODERATE EPISODE OF RECURRENT MAJOR DEPRESSIVE DISORDER: Primary | ICD-10-CM

## 2023-12-19 DIAGNOSIS — C50.912 INVASIVE LOBULAR CARCINOMA OF LEFT BREAST IN FEMALE: ICD-10-CM

## 2023-12-19 PROCEDURE — 90791 PR PSYCHIATRIC DIAGNOSTIC EVALUATION: ICD-10-PCS | Mod: S$GLB,,,

## 2023-12-19 PROCEDURE — 99999 PR PBB SHADOW E&M-EST. PATIENT-LVL II: CPT | Mod: PBBFAC,,,

## 2023-12-19 PROCEDURE — 90791 PSYCH DIAGNOSTIC EVALUATION: CPT | Mod: S$GLB,,,

## 2023-12-19 PROCEDURE — 99999 PR PBB SHADOW E&M-EST. PATIENT-LVL II: ICD-10-PCS | Mod: PBBFAC,,,

## 2023-12-19 NOTE — PROGRESS NOTES
PSYCHO-ONCOLOGY INTAKE    Diagnostic Interview - CPT 29283    Date: 12/19/2023  Site: [place], [place]    Evaluation Length (direct face-to-face time):  1 hour    This includes face to face time and non-face to face time preparing to see the patient, obtaining and/or reviewing separately obtained history, documenting clinical information in the electronic or other health record, independently interpreting results and communicating results to the patient/family/caregiver, or care coordinator.     Referral Source: Benita Parisi MD   Oncologist:   PCP: [facility]    Clinical status of patient: Outpatient    Sheri Garces, a 58 y.o. female, seen for initial evaluation visit.    Sheri Garces reviewed and agreed to informed consent and the limits of confidentiality.    Chief complaint/reason for encounter: Patient reported that she has had significant difficulty adjusting to her illness. She disclosed that she lives with chronic pain that has been exacerbated since her surgery. She reported that she feels isolated, and hopeless that she will be able to fully participate in her life again.     History of Present Illness: Patient reported a history of major depressive disorder. She stated that she has been taking Zoloft for 20 years, and is intermittently in therapy. She also mentioned that her marriage is occasionally cold and volatile which she stated contributes to her low self esteem and feelings of isolation.    Medical/Surgical History:    Patient Active Problem List   Diagnosis    Screen for colon cancer    Invasive lobular carcinoma of left breast in female    Bilateral hip pain    Anxiety    Insomnia    Nausea    Chronic low back pain    Tinnitus of both ears    Fatigue    Tobacco use disorder    At risk for lymphedema    Malignant neoplasm of upper-outer quadrant of left female breast     Health Behaviors:       ETOH Use: No        Tobacco Use: Yes (Patient endorsed smoking 1 pack per  day)   Illicit Drug Use:  No     Prescription Misuse:No   Caffeine: Patient endorsed drinking 3-4 Dr. Peppers per day   Exercise:The patient engages in little, if any physical activity.   Firearms:  Yes   Advanced directives:No     Family History:   Psychiatric illness: Yes (patient sister has been hospitalized during her teen years for a psychiatric concern)     Alcohol/Drug Abuse: Yes (Patients father and grandfather are alcoholics)     Suicide: No      Past Psychiatric History:   Inpatient treatment: No     Outpatient treatment: Yes (Patient reports intermittent therapy throughout her adult life)     Prior substance abuse treatment: No     Suicide Attempts: No     Psychotropic Medications:        Current medications as per below, allergies reviewed in chart.    Current Outpatient Medications   Medication    ALPRAZolam (XANAX) 0.25 MG tablet    atorvastatin (LIPITOR) 40 MG tablet    diphenhydrAMINE (BENADRYL) 25 mg capsule    eszopiclone 3 mg Tab    HYDROcodone-acetaminophen (NORCO)  mg per tablet    losartan (COZAAR) 25 MG tablet    ondansetron (ZOFRAN) 8 MG tablet    sertraline (ZOLOFT) 100 MG tablet     No current facility-administered medications for this visit.     Facility-Administered Medications Ordered in Other Visits   Medication Frequency    lactated ringers infusion Continuous     Social situation/Stressors: Sheri Garces lives with her  in [place], [place].  She is not currently working. Sheri Garces has been  22 years and has two adult children.  Her partner is Donnie.   The patient reports sparse social support through her family, and stated that she does not have many friends.  Sheri Garces is a Scientologist believer, without a current home [employer].  Sheri Garces's hobbies include listening to music, caring for animals and being outside.    Strengths:Housing stability, Motivation, readiness for change, and Financial stability  Liabilities: Pathological/unsupported  environment    Current Evaluation:     Mental Status Exam: Sheri Garces arrived promptly for the assessment session.  The patient was fully cooperative throughout the interview and was an adequate historian   Appearance: age appropriate, casually  dressed, adequately  groomed  Behavior/Cooperation: friendly and cooperative  Speech: normal in rate, volume, and tone and appropriate quality, quantity and organization of sentences  Mood: anxious  Affect: dysphoric  Thought Process: goal-directed, logical  Thought Content: normal, no suicidality, no homicidality, delusions, or paranoia;did not appear to be responding to internal stimuli during the interview.   Orientation: grossly intact  Memory: grossly intact  Attention Span/Concentration: Attends to interview without distraction; reports no difficulty  Fund of Knowledge: average  Estimate of Intelligence: average from verbal skills and history  Cognition: grossly intact  Insight: patient has awareness of illness; good insight into own behavior and behavior of others  Judgment: the patient's behavior is adequate to circumstances      Adjustment Assessment to Current Illness:    Oncology History   Invasive lobular carcinoma of left breast in female   10/30/2023 Initial Diagnosis    Invasive lobular carcinoma of left breast in female     10/30/2023 Cancer Staged    Staging form: Breast, AJCC 8th Edition  - Clinical stage from 10/30/2023: Stage IA (cT1a, cN0, cM0, G1, ER+, MI+, HER2-)     12/4/2023 Cancer Staged    Staging form: Breast, AJCC 8th Edition  - Pathologic stage from 12/4/2023: Stage IA (pT1b, pN0(sn), cM0, G1, ER+, MI+, HER2-)         Sheri Garces has adjusted to illness with difficulty primarily through avoidance. She has engaged in appropriate information gathering.  The patient has poor family/friend support.  Her support system is coping adequately with the diagnosis/treatment/prognosis. Illness-related psychosocial stressors include difficulty  "meeting family responsibilities and changes in ability to engage in leisure activities.  The patient has a good partnership with her [facility] treatment team.     Symptoms:   Mood: depressed mood, diminished interest, insomnia, and worthlessness/guilt;  prior depression:during her adulthood ; no SI/HI; PHQ-9: 11  Anxiety: Feeling nervous, anxious, or on edge, Difficulty relaxing, Fear of unknown, and muscle tension; anxiety throughout adulthood;  MAGDALENA-7=8  Substance abuse: denied  Cognitive functioning: denied  Health behaviors: Current smoker and lack of exercise.  Sleep: Patient stated that she wakes frequently during the night "every two hours." She reported that she feels very tired upon waking.   Pain: Ms. Garces reports chronic lower back pain. Onset of symptoms was gradual starting 20 years ago with stable course since that time. It was not related to no known injury. The pain is rated 4 /10 on the BEST day and4 /10 on the WORST DAY.  The pain is located in her left leg, back, and most recently under her left arm following her lumpectomy. The pain is described as aching, sharp, soreness, and tingling. Symptoms are exacerbated by standing. Factors which relieve the pain include rest.       Assessment - Diagnosis - Goals:   Major Depressive Disorder      Plan: Patient to return to clinic for therapeutic intervention. Clinician to make referral to psychiatry for medication management.    Summary and Recommendations  Sheri Garces is a 58 y.o. female referred by Benita Parisi MD for psychological evaluation and treatment.  Ms. Garces appears to be coping with her diagnosis and proposed treatment course with difficulty. Patient described current symptoms of depression and anxiety. It is recommended that she have her psychotropic medications reviewed. It is also recommended that she be seen for therapy weekly using ACT as the intervention.    Return to clinic: as scheduled    GOALS:   This " session consisted of history gathering and rapport building, goals for therapy yet to be established.    Aleja Call, PhD  Clinical Health Psychology Fellow

## 2024-01-04 ENCOUNTER — HOSPITAL ENCOUNTER (OUTPATIENT)
Dept: RADIATION THERAPY | Facility: HOSPITAL | Age: 59
Discharge: HOME | End: 2024-01-04
Attending: INTERNAL MEDICINE
Payer: COMMERCIAL

## 2024-01-04 ENCOUNTER — OFFICE VISIT (OUTPATIENT)
Dept: RADIATION ONCOLOGY | Facility: CLINIC | Age: 59
End: 2024-01-04
Payer: COMMERCIAL

## 2024-01-04 ENCOUNTER — TELEPHONE (OUTPATIENT)
Dept: DERMATOLOGY | Facility: CLINIC | Age: 59
End: 2024-01-04
Payer: COMMERCIAL

## 2024-01-04 VITALS
DIASTOLIC BLOOD PRESSURE: 79 MMHG | BODY MASS INDEX: 18.1 KG/M2 | RESPIRATION RATE: 18 BRPM | HEIGHT: 67 IN | HEART RATE: 90 BPM | OXYGEN SATURATION: 99 % | WEIGHT: 115.31 LBS | SYSTOLIC BLOOD PRESSURE: 119 MMHG | TEMPERATURE: 98 F

## 2024-01-04 DIAGNOSIS — F17.200 TOBACCO USE DISORDER: ICD-10-CM

## 2024-01-04 DIAGNOSIS — C50.912 INVASIVE LOBULAR CARCINOMA OF LEFT BREAST IN FEMALE: Primary | ICD-10-CM

## 2024-01-04 DIAGNOSIS — L60.3 NAIL DYSTROPHY: ICD-10-CM

## 2024-01-04 PROCEDURE — 99999 PR PBB SHADOW E&M-EST. PATIENT-LVL IV: CPT | Mod: PBBFAC,,, | Performed by: RADIOLOGY

## 2024-01-04 PROCEDURE — 99215 OFFICE O/P EST HI 40 MIN: CPT | Mod: 25,S$GLB,, | Performed by: RADIOLOGY

## 2024-01-04 PROCEDURE — 77014 HC CT GUIDANCE RADIATION THERAPY FLDS PLACEMENT: CPT | Mod: TC,PN | Performed by: RADIOLOGY

## 2024-01-04 PROCEDURE — 77334 RADIATION TREATMENT AID(S): CPT | Mod: TC,PN | Performed by: RADIOLOGY

## 2024-01-04 PROCEDURE — 77263 THER RADIOLOGY TX PLNG CPLX: CPT | Mod: ,,, | Performed by: RADIOLOGY

## 2024-01-04 PROCEDURE — 77290 THER RAD SIMULAJ FIELD CPLX: CPT | Mod: TC,PN | Performed by: RADIOLOGY

## 2024-01-04 PROCEDURE — 77334 RADIATION TREATMENT AID(S): CPT | Mod: 26,,, | Performed by: RADIOLOGY

## 2024-01-04 PROCEDURE — 77290 THER RAD SIMULAJ FIELD CPLX: CPT | Mod: 26,,, | Performed by: RADIOLOGY

## 2024-01-04 NOTE — TELEPHONE ENCOUNTER
----- Message from Mari Pereira RN sent at 1/4/2024 10:26 AM CST -----  Regarding: Referral entered in November Hi, Dr. Brady wanted me to check on this Derm referral for Dr. Cantor.  The patient hasn't heard from you yet.  If Dr. Cantor is out, it is okay to schedule with another MD.

## 2024-01-04 NOTE — PROGRESS NOTES
ADDENDUM 1/10/24:  On review of planning imaging, patient may be candidate for accelerated partial breast irradiation.  Will attempt planning partial breast treatment- if unfavorable will revert to whole breast.    [facility] Department of Radiation Oncology  Follow Up Visit Note    Diagnosis:  Sheri Garces is a 58 y.o. 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     Oncologic History:  Oncology History   Invasive lobular carcinoma of left breast in female   10/30/2023 Initial Diagnosis    Invasive lobular carcinoma of left breast in female     10/30/2023 Cancer Staged    Staging form: Breast, AJCC 8th Edition  - Clinical stage from 10/30/2023: Stage IA (cT1a, cN0, cM0, G1, ER+, PA+, HER2-)     12/4/2023 Cancer Staged    Staging form: Breast, AJCC 8th Edition  - Pathologic stage from 12/4/2023: Stage IA (pT1b, pN0(sn), cM0, G1, ER+, PA+, HER2-)          Interval History  The patient presents today for a regularly scheduled follow up visit.  She was last seen in our clinic on 11/20/23.  Hasn't seen Derm for nail dystrophy or LDCT lung screener.  Continues to smoke 1ppd. Since that time, she underwent definitive surgery.    11/28/23 left upper outer quadrant lumpectomy:  0/1 sentinel lymph node  Lumpetomy: invasive mammary carcinoma, mixed ductal and lobular, G1, 0.6cm, no LVI, margins all negative (3mm posterior); pT1b N0  Additional margins negative for malignancy    She presents today to discuss adjuvant radiation therapy.      HPI: The patient is a 58 y.o. year old female with a new diagnosis of breast cancer. She initially presented due to abnormal mammogram.      10/16/23 Screening mammogram:  left 7mm focal asymmetry UOQ     10/20/23 Diagnostic left mammogram/ultrasound:   Mammogram: asymmetry presists, associated architectural distortion; also asymmetry in central  "breast  U/S: 5 x 5 x 3mm irregularly shaped hypoechoic mass with indistinct margins in left 1:00     10/24/23 Left UOQ biopsy: in situ and invasive lobular carcinoma, low grade, +/+/-, Ki67 2%  Central breast biopsy: benign     10/31/23 MRI Breasts: 7 x 4 x 4mm below threshold enhancement surrounding left upper outer quadrant biopsy clip, middle depth; no suspicious lymph nodes       Review of Systems   Review of Systems   Constitutional:  Positive for malaise/fatigue (since surgery) and weight loss (~few lbs since surgery). Negative for chills and fever.   HENT:          Dysguesia after surgery- recovering     Eyes:  Negative for blurred vision and double vision.   Respiratory:  Negative for cough and shortness of breath.    Cardiovascular:  Negative for chest pain.   Gastrointestinal:  Negative for constipation, diarrhea, nausea and vomiting.   Musculoskeletal:  Positive for back pain (low back- chronic).   Neurological:  Negative for dizziness and headaches.       Social History:  Social History     Tobacco Use    Smoking status: Every Day     Current packs/day: 1.00     Types: Cigarettes    Smokeless tobacco: Former   Substance Use Topics    Alcohol use: No    Drug use: Not Currently       Family History:  Cancer-related family history includes Bone cancer (age of onset: 87) in her father; Cancer in her father and paternal uncle; Lung cancer in her paternal uncle; Prostate cancer (age of onset: 80) in her father.    Exam:  Vitals:    01/04/24 0935   BP: 119/79   BP Location: Right arm   Patient Position: Sitting   Pulse: 90   Resp: 18   Temp: 97.6 °F (36.4 °C)   SpO2: 99%   Weight: 52.3 kg (115 lb 4.8 oz)   Height: 5' 6.5" (1.689 m)     Constitutional: Pleasant 58 y.o. female in no acute distress.  Well nourished. Well groomed.   Physical Exam  Vitals reviewed. Exam conducted with a chaperone present.   Constitutional:       Appearance: Normal appearance. She is normal weight.   HENT:      Head: Normocephalic " and atraumatic.      Mouth/Throat:      Mouth: Mucous membranes are moist.   Eyes:      Pupils: Pupils are equal, round, and reactive to light.   Pulmonary:      Effort: Pulmonary effort is normal.   Chest:          Comments: Surgical incision C/D/I  Lymphadenopathy:      Upper Body:      Left upper body: No supraclavicular, axillary or pectoral adenopathy.   Neurological:      Mental Status: She is alert.              Assessment:  58 y.o. 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   ECOG: (0) Fully active, able to carry on all predisease performance without restriction    Plan:  CT simulation today for radiation therapy planning  We discussed the goals of treatment to be curative.  The risks, benefits, scheduling, alternatives to and rationale of radiation therapy were explained in detail.   We discussed the indications, course, and potential risks associated with radiation therapy, including but not limited to fatigue, local skin reaction such as hyperpigmentation, tenderness or erythema, breast pain, and potential long term toxicities such as rib fragility, and remote risks of lung inflammation, esophageal inflammation, heart inflammation, or secondary malignancy.  She expressed understanding of these risks, all questions were answered to her apparent satisfaction.   Informed consent obtained  We will plan to treat the entire left breast to a dose of 26Gy in 5 fractions delivered daily, to start in 1-2 weeks (unfavorable cavity-to-breast ratio for PBI)  We will utilize a(n) 3D with DIBH technique.   We will utilize daily CT or orthogonal image guidance due to the need for accurate daily patient alignment to treat the target volumes accurately and avoid radiation overdose to multiple regional organs at risk since we are treating the patient with complex target volumes with multiple steep isodose  gradients.   She was given our contact information, and she was told that she could call our clinic at anytime if she has any questions or concerns.  Follow up referral to Dermatology for nail dystrophy  Low Dose Lung Screener will be scheduled  Follow up with other providers as directed    Total time today for this encounter was 45 minutes. This includes preparing to see the patient (eg, review of tests), obtaining and/or reviewing separately obtained history, documenting clinical information in the electronic or other health record, independently interpreting results and communicating results to the patient/family/caregiver, and discussing the plan with other providers when necessary.

## 2024-01-09 ENCOUNTER — TELEPHONE (OUTPATIENT)
Dept: DERMATOLOGY | Facility: CLINIC | Age: 59
End: 2024-01-09
Payer: COMMERCIAL

## 2024-01-09 ENCOUNTER — PATIENT MESSAGE (OUTPATIENT)
Dept: PSYCHIATRY | Facility: CLINIC | Age: 59
End: 2024-01-09
Payer: COMMERCIAL

## 2024-01-09 NOTE — TELEPHONE ENCOUNTER
----- Message from Umesh Good MA sent at 1/9/2024  1:25 PM CST -----    ----- Message -----  From: Emory Gutierrez  Sent: 1/9/2024   1:05 PM CST  To: Devaughn Masterson Staff    Type:  Patient Returning Call    Who Called:  pt  Who Left Message for Patient:  Kosta  Does the patient know what this is regarding?:  Yes  Best Call Back Number:  [phone]  Additional Information:  Please call back to advise Thanks!

## 2024-01-11 ENCOUNTER — CLINICAL SUPPORT (OUTPATIENT)
Dept: SMOKING CESSATION | Facility: CLINIC | Age: 59
End: 2024-01-11

## 2024-01-11 DIAGNOSIS — F17.200 TOBACCO USE DISORDER: Primary | ICD-10-CM

## 2024-01-11 RX ORDER — IBUPROFEN 200 MG
1 TABLET ORAL DAILY
Qty: 14 PATCH | Refills: 0 | Status: SHIPPED | OUTPATIENT
Start: 2024-01-11 | End: 2024-02-01 | Stop reason: DRUGHIGH

## 2024-01-11 RX ORDER — DIPHENHYDRAMINE HCL 25 MG
CAPSULE ORAL
Qty: 100 EACH | Refills: 0 | Status: SHIPPED | OUTPATIENT
Start: 2024-01-11

## 2024-01-11 NOTE — Clinical Note
Patient will be participating in weekly tobacco cessation meetings and will begin the prescribed tobacco cessation medication regime of the 21 mg patch and 4 mg gum. Patient has used Wellbutrin and patches before.

## 2024-01-12 ENCOUNTER — PATIENT MESSAGE (OUTPATIENT)
Dept: HEMATOLOGY/ONCOLOGY | Facility: CLINIC | Age: 59
End: 2024-01-12
Payer: COMMERCIAL

## 2024-01-12 PROCEDURE — 77301 RADIOTHERAPY DOSE PLAN IMRT: CPT | Mod: 26,,, | Performed by: RADIOLOGY

## 2024-01-12 PROCEDURE — 77293 RESPIRATOR MOTION MGMT SIMUL: CPT | Mod: TC,PN | Performed by: RADIOLOGY

## 2024-01-12 PROCEDURE — 77301 RADIOTHERAPY DOSE PLAN IMRT: CPT | Mod: TC,PN | Performed by: RADIOLOGY

## 2024-01-13 PROCEDURE — 77300 RADIATION THERAPY DOSE PLAN: CPT | Mod: 26,,, | Performed by: RADIOLOGY

## 2024-01-13 PROCEDURE — 77338 DESIGN MLC DEVICE FOR IMRT: CPT | Mod: TC,PN | Performed by: RADIOLOGY

## 2024-01-13 PROCEDURE — 77338 DESIGN MLC DEVICE FOR IMRT: CPT | Mod: 26,,, | Performed by: RADIOLOGY

## 2024-01-13 PROCEDURE — 77300 RADIATION THERAPY DOSE PLAN: CPT | Mod: TC,PN | Performed by: RADIOLOGY

## 2024-01-17 PROCEDURE — 77385 HC IMRT, SIMPLE: CPT | Mod: PN | Performed by: RADIOLOGY

## 2024-01-17 PROCEDURE — 77427 RADIATION TX MANAGEMENT X5: CPT | Mod: ,,, | Performed by: RADIOLOGY

## 2024-01-17 PROCEDURE — 77014 PR  CT GUIDANCE PLACEMENT RAD THERAPY FIELDS: CPT | Mod: 26,,, | Performed by: RADIOLOGY

## 2024-01-18 ENCOUNTER — HOSPITAL ENCOUNTER (OUTPATIENT)
Dept: RADIOLOGY | Facility: HOSPITAL | Age: 59
Discharge: HOME | End: 2024-01-18
Attending: RADIOLOGY
Payer: COMMERCIAL

## 2024-01-18 DIAGNOSIS — Z72.0 TOBACCO ABUSE: ICD-10-CM

## 2024-01-18 DIAGNOSIS — C50.912 INVASIVE LOBULAR CARCINOMA OF LEFT BREAST IN FEMALE: ICD-10-CM

## 2024-01-19 PROCEDURE — 77014 PR  CT GUIDANCE PLACEMENT RAD THERAPY FIELDS: CPT | Mod: 26,,, | Performed by: RADIOLOGY

## 2024-01-19 PROCEDURE — 77385 HC IMRT, SIMPLE: CPT | Mod: PN | Performed by: RADIOLOGY

## 2024-01-22 ENCOUNTER — LAB VISIT (OUTPATIENT)
Dept: LAB | Facility: HOSPITAL | Age: 59
End: 2024-01-22
Attending: INTERNAL MEDICINE
Payer: COMMERCIAL

## 2024-01-22 ENCOUNTER — OFFICE VISIT (OUTPATIENT)
Dept: HEMATOLOGY/ONCOLOGY | Facility: CLINIC | Age: 59
End: 2024-01-22
Payer: COMMERCIAL

## 2024-01-22 VITALS
SYSTOLIC BLOOD PRESSURE: 117 MMHG | TEMPERATURE: 98 F | RESPIRATION RATE: 12 BRPM | BODY MASS INDEX: 18.1 KG/M2 | HEIGHT: 67 IN | WEIGHT: 115.31 LBS | HEART RATE: 105 BPM | DIASTOLIC BLOOD PRESSURE: 91 MMHG | OXYGEN SATURATION: 98 %

## 2024-01-22 DIAGNOSIS — D72.829 LEUKOCYTOSIS, UNSPECIFIED TYPE: ICD-10-CM

## 2024-01-22 DIAGNOSIS — F17.200 TOBACCO USE DISORDER: ICD-10-CM

## 2024-01-22 DIAGNOSIS — C50.912 INVASIVE LOBULAR CARCINOMA OF LEFT BREAST IN FEMALE: ICD-10-CM

## 2024-01-22 DIAGNOSIS — Z79.811 USE OF ANASTROZOLE: ICD-10-CM

## 2024-01-22 DIAGNOSIS — C50.912 INVASIVE LOBULAR CARCINOMA OF LEFT BREAST IN FEMALE: Primary | ICD-10-CM

## 2024-01-22 LAB
ALBUMIN SERPL BCP-MCNC: 4.9 G/DL (ref 3.5–5.2)
ALP SERPL-CCNC: 75 U/L (ref 55–135)
ALT SERPL W/O P-5'-P-CCNC: 34 U/L (ref 10–44)
ANION GAP SERPL CALC-SCNC: 12 MMOL/L (ref 8–16)
AST SERPL-CCNC: 25 U/L (ref 10–40)
BASOPHILS # BLD AUTO: 0.08 K/UL (ref 0–0.2)
BASOPHILS NFR BLD: 0.7 % (ref 0–1.9)
BILIRUB SERPL-MCNC: 1 MG/DL (ref 0.1–1)
BUN SERPL-MCNC: 13 MG/DL (ref 6–20)
CALCIUM SERPL-MCNC: 10.6 MG/DL (ref 8.7–10.5)
CHLORIDE SERPL-SCNC: 96 MMOL/L (ref 95–110)
CO2 SERPL-SCNC: 22 MMOL/L (ref 23–29)
CREAT SERPL-MCNC: 0.7 MG/DL (ref 0.5–1.4)
DIFFERENTIAL METHOD BLD: ABNORMAL
EOSINOPHIL # BLD AUTO: 0.2 K/UL (ref 0–0.5)
EOSINOPHIL NFR BLD: 1.4 % (ref 0–8)
ERYTHROCYTE [DISTWIDTH] IN BLOOD BY AUTOMATED COUNT: 11.9 % (ref 11.5–14.5)
EST. GFR  (NO RACE VARIABLE): >60 ML/MIN/1.73 M^2
GLUCOSE SERPL-MCNC: 117 MG/DL (ref 70–110)
HCT VFR BLD AUTO: 48 % (ref 37–48.5)
HGB BLD-MCNC: 16.6 G/DL (ref 12–16)
IMM GRANULOCYTES # BLD AUTO: 0.03 K/UL (ref 0–0.04)
IMM GRANULOCYTES NFR BLD AUTO: 0.3 % (ref 0–0.5)
LDH SERPL L TO P-CCNC: 215 U/L (ref 110–260)
LYMPHOCYTES # BLD AUTO: 2.1 K/UL (ref 1–4.8)
LYMPHOCYTES NFR BLD: 19.1 % (ref 18–48)
MCH RBC QN AUTO: 31.7 PG (ref 27–31)
MCHC RBC AUTO-ENTMCNC: 34.6 G/DL (ref 32–36)
MCV RBC AUTO: 92 FL (ref 82–98)
MONOCYTES # BLD AUTO: 0.7 K/UL (ref 0.3–1)
MONOCYTES NFR BLD: 6.3 % (ref 4–15)
NEUTROPHILS # BLD AUTO: 8 K/UL (ref 1.8–7.7)
NEUTROPHILS NFR BLD: 72.2 % (ref 38–73)
NRBC BLD-RTO: 0 /100 WBC
PATH REV BLD -IMP: NORMAL
PLATELET # BLD AUTO: 428 K/UL (ref 150–450)
PMV BLD AUTO: 9.3 FL (ref 9.2–12.9)
POTASSIUM SERPL-SCNC: 5.1 MMOL/L (ref 3.5–5.1)
PROT SERPL-MCNC: 8.6 G/DL (ref 6–8.4)
RBC # BLD AUTO: 5.24 M/UL (ref 4–5.4)
RETICS/RBC NFR AUTO: 1.6 % (ref 0.5–2.5)
SODIUM SERPL-SCNC: 130 MMOL/L (ref 136–145)
WBC # BLD AUTO: 11.08 K/UL (ref 3.9–12.7)

## 2024-01-22 PROCEDURE — 85060 BLOOD SMEAR INTERPRETATION: CPT | Mod: ,,, | Performed by: PATHOLOGY

## 2024-01-22 PROCEDURE — 85025 COMPLETE CBC W/AUTO DIFF WBC: CPT | Mod: PN | Performed by: INTERNAL MEDICINE

## 2024-01-22 PROCEDURE — 83615 LACTATE (LD) (LDH) ENZYME: CPT | Mod: PN | Performed by: INTERNAL MEDICINE

## 2024-01-22 PROCEDURE — 85045 AUTOMATED RETICULOCYTE COUNT: CPT | Mod: PN | Performed by: INTERNAL MEDICINE

## 2024-01-22 PROCEDURE — 80053 COMPREHEN METABOLIC PANEL: CPT | Mod: PN | Performed by: INTERNAL MEDICINE

## 2024-01-22 PROCEDURE — 77014 PR  CT GUIDANCE PLACEMENT RAD THERAPY FIELDS: CPT | Mod: 26,,, | Performed by: RADIOLOGY

## 2024-01-22 PROCEDURE — 77385 HC IMRT, SIMPLE: CPT | Mod: PN | Performed by: RADIOLOGY

## 2024-01-22 PROCEDURE — 99999 PR PBB SHADOW E&M-EST. PATIENT-LVL IV: CPT | Mod: PBBFAC,,, | Performed by: INTERNAL MEDICINE

## 2024-01-22 PROCEDURE — 36415 COLL VENOUS BLD VENIPUNCTURE: CPT | Mod: PN | Performed by: INTERNAL MEDICINE

## 2024-01-22 PROCEDURE — 99214 OFFICE O/P EST MOD 30 MIN: CPT | Mod: S$GLB,,, | Performed by: INTERNAL MEDICINE

## 2024-01-22 RX ORDER — ANASTROZOLE 1 MG/1
1 TABLET ORAL DAILY
Qty: 30 TABLET | Refills: 2 | Status: SHIPPED | OUTPATIENT
Start: 2024-01-22 | End: 2024-03-15

## 2024-01-22 RX ORDER — AMOXICILLIN 500 MG/1
500 TABLET, FILM COATED ORAL EVERY 12 HOURS
COMMUNITY
End: 2024-01-26

## 2024-01-22 NOTE — PROGRESS NOTES
CONSULT NOTE    Subjective:       Patient ID: Sheri Garces is a 58 y.o. female.  MRN: [id number]  : 1965    Chief Complaint: Breast Cancer   Stage IA (cT1a, N0, M0, Grade 1, ER+/ND+/HER2-) Invasive lobular carcinoma of the left breast     History of Present Illness:   hSeri Garces is a 58 y.o. female who is referred for ILC of the left breast.      She initially presented due to abnormal mammogram. Missed one in .      10/16/23 Screening mammogram:  left 7mm focal asymmetry UOQ     10/20/23 Diagnostic left mammogram/ultrasound:   Mammogram: asymmetry presists, associated architectural distortion; also asymmetry in central breast  U/S: 5 x 5 x 3mm irregularly shaped hypoechoic mass with indistinct margins in left 1:00     10/24/23 Left UOQ biopsy: in situ and invasive lobular carcinoma, low grade, +/+/-, Ki67 2%  Central breast biopsy: benign     10/31/23 MRI Breasts: 7 x 4 x 4mm below threshold enhancement surrounding left upper outer quadrant biopsy clip, middle depth; no suspicious lymph nodes      Underwnt  lumpectomy and sentinel node23. Currently on adjuvant RT, plan for 5 fractions, 2/5 completed. Felt loss of appetite and taste after surgery, improving now.     Oncotype is 20, with <1% chemo benefit and 6% risk of distant recurrence with AI or Tamoxifen.     Menarche at age 11. . Age at first live birth 21. did not breast feed . LMP N/A. OCP-N/A. Menopause at late 40's. Denies HRT.    Had genetic testing done in  and it is negative.   Will be seeing Integrative oncology and starting yoga soon.      Has been seeing Dr. Melendrez for leukocytosis and thrombocytosis. Denies any history of strokes, thrombosis or cad. Reports chills and night sweats for about a year. Denies unintentional weight loss.     + smoker for 40 years. Is willing to stop, will use a nicotine patch after RT.            Family history cancer:  Father - Prostate  "Cancer Dx 80, now has MDS   Paternal Uncle - Prostate Cancer & Lung Cancer Dx 60  Paternal Grandfather - Leukemia 70     Smoker 1 Pk/ 40 years   Covid-19 vaccine left arm     Relevant medical history:  Pain management - [place]     Oncology History:  Oncology History   Invasive lobular carcinoma of left breast in female   10/30/2023 Initial Diagnosis    Invasive lobular carcinoma of left breast in female     10/30/2023 Cancer Staged    Staging form: Breast, AJCC 8th Edition  - Clinical stage from 10/30/2023: Stage IA (cT1a, cN0, cM0, G1, ER+, DE+, HER2-)     12/4/2023 Cancer Staged    Staging form: Breast, AJCC 8th Edition  - Pathologic stage from 12/4/2023: Stage IA (pT1b, pN0(sn), cM0, G1, ER+, DE+, HER2-)       11/28/23  1. LYMPH NODE, LEFT SENTINEL, EXCISIONAL BIOPSY:   --ONE LYMPH NODE WITH NO MORPHOLOGIC EVIDENCE OF MALIGNANCY (0/1).     2. BREAST, LEFT, FURTHER DESIGNATED "UPPER OUTER QUADRANT LESION,"    LUMPECTOMY:   --INVASIVE MAMMARY CARCINOMA, SHIRA HISTOLOGIC GRADE 1 OUT OF 3,    WITH MIXED DUCTAL AND LOBULAR FEATURES.   --INVASIVE CARCINOMA MEASURES 6 MILLIMETERS.   --BIOPSY SITE CHANGES.   --RECEPTOR STUDIES REPORTED PREVIOUSLY ON NEEDLE CORE BIOPSY MATERIAL    (SEE COMMENT).   --NO LYMPHATIC AND/OR VASCULAR INVASION IDENTIFIED.   --PART 2 SPECIMEN MARGINS ARE NEGATIVE FOR MALIGNANCY; NEAREST PART 2    SPECIMEN MARGIN (POSTERIOR) IS APPROXIMATELY 3 MILLIMETERS AWAY; ALL    REMAINING PART 2 SPECIMEN MARGINS ARE GREATER THAN 5 MILLIMETERS AWAY;    ADDITIONAL MARGINS SENT SEPARATELY (SEE BELOW PARTS 3, 4, AND 5).   --ATYPICAL LOBULAR HYPERPLASIA.   --MICROCALCIFICATIONS PRESENT (PREDOMINANTLY IN NON-NEOPLASTIC TISSUE).   --pT1b/pN0.     3. BREAST, LEFT, FURTHER DESIGNATED "MEDIAL INFERIOR MARGIN," EXCISION:   --FIBROCYSTIC CHANGES INCLUDING STROMAL FIBROSIS, CYSTIC DILATATION OF    DUCTS, FOCAL APOCRINE METAPLASIA, AND MILD USUAL DUCTAL EPITHELIAL    HYPERPLASIA.   --FOCAL FIBROADENOMATOID CHANGES. " "  --NO MORPHOLOGIC EVIDENCE OF IN-SITU OR INVASIVE MALIGNANCY.     4. BREAST, LEFT, FURTHER DESIGNATED "LATERAL DEEP MARGIN," EXCISION:   --ATYPICAL LOBULAR HYPERPLASIA.   --NO MORPHOLOGIC EVIDENCE OF IN-SITU OR INVASIVE MALIGNANCY.     5. BREAST, LEFT, FURTHER DESIGNATED "ANTERIOR SUPERIOR MARGIN,"    EXCISION:   --NO MORPHOLOGIC EVIDENCE OF IN-SITU OR INVASIVE MALIGNANCY.   pT1b/pN0.     ER 96.3%, CO 4.9%, Her 2 1+, Ki 67 2.1%.     History:  Past Medical History:   Diagnosis Date    Anxiety     Breast cancer     Bursitis of both hips     Chronic back pain     Chronic leg pain     left    Depression     Hyperlipidemia     Hypertension     Insomnia     Miscarriage     Sleep apnea     has not been using    Tinnitus       Past Surgical History:   Procedure Laterality Date    BACK SURGERY      17 years ago    BREAST BIOPSY      COLONOSCOPY N/A 09/19/2019    Procedure: COLONOSCOPY;  Surgeon: Andrea Watt MD;  Location: [facility];  Service: Endoscopy;  Laterality: N/A;    DILATION AND CURETTAGE OF UTERUS      LUMPECTOMY, WITH RADAR LOCALIZATION USING ZONIA  Left 11/28/2023    Procedure: LUMPECTOMY,WITH RADAR LOCALIZATION USING ZONIA ;  Surgeon: Akanksha Leon MD;  [facility];  Service: General;  Laterality: Left;    SENTINEL LYMPH NODE BIOPSY Left 11/28/2023    Procedure: BIOPSY, LYMPH NODE, SENTINEL;  Surgeon: Akanksha Leon MD;  Location: [facility];  Service: General;  Laterality: Left;     Family History   Problem Relation Age of Onset    Heart disease Mother     Cancer Father     Diabetes Father     Prostate cancer Father 80    Bone cancer Father 87    Cancer Paternal Uncle         prostate    Lung cancer Paternal Uncle     Leukemia Paternal Grandfather 70      Social History     Tobacco Use    Smoking status: Every Day     Current packs/day: 1.00     Average packs/day: 1 pack/day for 41.1 years (41.1 ttl pk-yrs)     Types: Cigarettes     Start date: 1983    Smokeless tobacco: Former " "  Substance and Sexual Activity    Alcohol use: No    Drug use: Not Currently    Sexual activity: Not on file        ROS:   Review of Systems   Constitutional:  Negative for fever, malaise/fatigue and weight loss.   HENT:  Negative for congestion, hearing loss, nosebleeds and sore throat.    Eyes:  Negative for double vision and photophobia.   Respiratory:  Negative for cough, hemoptysis, sputum production, shortness of breath and wheezing.    Cardiovascular:  Negative for chest pain, palpitations, orthopnea and leg swelling.   Gastrointestinal:  Negative for abdominal pain, blood in stool, constipation, diarrhea, heartburn, nausea and vomiting.   Genitourinary:  Negative for dysuria, hematuria and urgency.   Musculoskeletal:  Negative for back pain, joint pain and myalgias.   Skin:  Negative for itching and rash.   Neurological:  Negative for dizziness, tingling, seizures, weakness and headaches.   Endo/Heme/Allergies:  Negative for polydipsia. Does not bruise/bleed easily.   Psychiatric/Behavioral:  Negative for depression and memory loss. The patient is nervous/anxious. The patient does not have insomnia.    All other systems reviewed and are negative.       Objective:     Vitals:    01/22/24 1259   BP: (!) 117/91   Pulse: 105   Resp: 12   Temp: 97.5 °F (36.4 °C)   TempSrc: Temporal   SpO2: 98%   Weight: 52.3 kg (115 lb 4.8 oz)   Height: 5' 6.5" (1.689 m)   PainSc:   2   PainLoc: Back       Physical Examination:   Physical Exam  Constitutional:       Appearance: Normal appearance.   HENT:      Head: Normocephalic and atraumatic.      Nose: Nose normal.   Eyes:      Extraocular Movements: Extraocular movements intact.      Conjunctiva/sclera: Conjunctivae normal.   Cardiovascular:      Rate and Rhythm: Normal rate and regular rhythm.   Pulmonary:      Effort: Pulmonary effort is normal.   Musculoskeletal:         General: Normal range of motion.      Cervical back: Normal range of motion.   Skin:     General: Skin " is warm and dry.   Neurological:      General: No focal deficit present.      Mental Status: She is alert.   Psychiatric:         Mood and Affect: Mood normal.         Behavior: Behavior normal.          Diagnostic Tests:  Significant Imaging: I have reviewed and interpreted all pertinent imaging results/findings.      Laboratory Data:  All pertinent labs have been reviewed.    Labs:   Lab Results   Component Value Date    WBC 11.08 01/22/2024    HGB 16.6 (H) 01/22/2024    HCT 48.0 01/22/2024    MCV 92 01/22/2024     01/22/2024       Assessment/Plan:   Invasive lobular carcinoma of left breast in female   Stage IA (cT1a, N0, M0, Grade 1, ER+/N[place]+/HER2-) Invasive lobular carcinoma of the left breast     S/p lumpectomy, on adjuvant RT.     We discussed the role of adjuvant endocrine therapy to reduce to risk of recurrence by 50% in the next 10 years. She is a candidate for AI.   Side effects including fatigue, hot flashes, bone loss were discussed. Rare side effects including risk of thrombosis, heart disease, rash, mood changes were also discussed. She is agreeable.      -     Ambulatory referral/consult to Hematology / Oncology  -     DXA Bone Density Axial Skeleton 1 or more sites; Future; Expected date: 01/22/2024  -     CBC Auto Differential; Standing  -     Pathologist Interpretation Differential; Future; Expected date: 01/22/2024  -     CMP; Future; Expected date: 01/22/2024  -     anastrozole (ARIMIDEX) 1 mg Tab; Take 1 tablet (1 mg total) by mouth once daily.  Dispense: 30 tablet; Refill: 2    Use of anastrozole  As above.    Tobacco use disorder  Cutting down.     Leukocytosis, unspecified type  Obtain outside records.   -     RETICULOCYTES; Future; Expected date: 01/22/2024  -     Lactate Dehydrogenase; Standing       ECOG SCORE    0 - Fully active-able to carry on all pre-disease performance without restriction           Discussion:   No follow-ups on file.    Plan was discussed with the patient at  length, and she verbalized understanding. Sheri was given an opportunity to ask questions that were answered to her satisfaction, and she was advised to call in the interval if any problems or questions arise.    Electronically signed by Jocelynn Munoz MD        Route Chart for Scheduling    Med Onc Chart Routing  Urgent    Follow up with physician . 3/4 change virtual 1 pm to in person   Follow up with DEAN    Infusion scheduling note    Injection scheduling note    Labs CBC, pathologist interp. differential, reticulocytes and LDH   Scheduling:  Preferred lab:  Lab interval:     Imaging    Pharmacy appointment    Other referrals

## 2024-01-23 LAB — PATH REV BLD -IMP: NORMAL

## 2024-01-24 ENCOUNTER — TELEPHONE (OUTPATIENT)
Dept: SMOKING CESSATION | Facility: CLINIC | Age: 59
End: 2024-01-24
Payer: COMMERCIAL

## 2024-01-24 PROCEDURE — 77385 HC IMRT, SIMPLE: CPT | Mod: PN | Performed by: RADIOLOGY

## 2024-01-24 PROCEDURE — 77014 PR  CT GUIDANCE PLACEMENT RAD THERAPY FIELDS: CPT | Mod: 26,,, | Performed by: RADIOLOGY

## 2024-01-26 PROCEDURE — 77385 HC IMRT, SIMPLE: CPT | Mod: PN | Performed by: RADIOLOGY

## 2024-01-26 PROCEDURE — 77014 PR  CT GUIDANCE PLACEMENT RAD THERAPY FIELDS: CPT | Mod: 26,,, | Performed by: RADIOLOGY

## 2024-01-29 DIAGNOSIS — C50.912 INVASIVE LOBULAR CARCINOMA OF LEFT BREAST IN FEMALE: Primary | ICD-10-CM

## 2024-01-29 PROCEDURE — 77336 RADIATION PHYSICS CONSULT: CPT | Mod: PN | Performed by: RADIOLOGY

## 2024-02-01 ENCOUNTER — CLINICAL SUPPORT (OUTPATIENT)
Dept: SMOKING CESSATION | Facility: CLINIC | Age: 59
End: 2024-02-01

## 2024-02-01 DIAGNOSIS — F17.200 TOBACCO USE DISORDER: Primary | ICD-10-CM

## 2024-02-01 RX ORDER — IBUPROFEN 200 MG
1 TABLET ORAL DAILY
Qty: 14 PATCH | Refills: 0 | Status: SHIPPED | OUTPATIENT
Start: 2024-02-01 | End: 2024-02-15 | Stop reason: SDUPTHER

## 2024-02-01 NOTE — Clinical Note
Patient is currently smoking 14 cpd and using the 21 mg patch with no negative side effects at this time. Patient will begin using the gum today. We discussed session 1 material including triggers, urges and ambivalence. We also discussed reduction and using NRT as directed. Patient was a virtual but we had to complete appointment via telephone due to audio difficulties. Patient has a follow up in 2 weeks.

## 2024-02-01 NOTE — PROGRESS NOTES
Individual Follow-Up Form    2/1/2024    Quit Date:     Clinical Status of Patient: Outpatient    Length of Service: 60 minutes    Continuing Medication: yes  Patches    Other Medications: gum     Target Symptoms: Withdrawal and medication side effects. The following were  rated moderate (3) to severe (4) on TCRS:  Moderate (3): none  Severe (4): none    Comments: Patient is currently smoking 14 cpd and using the 21 mg patch with no negative side effects at this time. Patient will begin using the gum today. We discussed session 1 material including triggers, urges and ambivalence. We also discussed reduction and using NRT as directed. Patient was a virtual but we had to complete appointment via telephone due to audio difficulties. Patient has a follow up in 2 weeks.    Diagnosis: F17.200    Next Visit: 2 weeks

## 2024-02-05 ENCOUNTER — TELEPHONE (OUTPATIENT)
Dept: HEMATOLOGY/ONCOLOGY | Facility: CLINIC | Age: 59
End: 2024-02-05
Payer: COMMERCIAL

## 2024-02-15 ENCOUNTER — CLINICAL SUPPORT (OUTPATIENT)
Dept: SMOKING CESSATION | Facility: CLINIC | Age: 59
End: 2024-02-15

## 2024-02-15 DIAGNOSIS — F17.200 TOBACCO USE DISORDER: ICD-10-CM

## 2024-02-15 RX ORDER — IBUPROFEN 200 MG
1 TABLET ORAL DAILY
Qty: 14 PATCH | Refills: 0 | Status: SHIPPED | OUTPATIENT
Start: 2024-02-15 | End: 2024-03-06 | Stop reason: SDUPTHER

## 2024-02-15 NOTE — PROGRESS NOTES
Individual Follow-Up Form    2/15/2024    Quit Date:     Clinical Status of Patient: Outpatient    Length of Service: 60 minutes    Continuing Medication: yes  Patches    Other Medications: gum     Target Symptoms: Withdrawal and medication side effects. The following were  rated moderate (3) to severe (4) on TCRS:  Moderate (3): none  Severe (4): none    Comments: Patient is currently smoking 14 cpd and using the patches and gum with no negative side effects at this time. Patient has not used the patches for a few days but plans on starting again tomorrow. We discussed session 2 material including health and nicotine withdrawal symptoms. Patient has a follow up in 2 weeks.    Diagnosis: F17.200    Next Visit: 2 weeks

## 2024-02-15 NOTE — Clinical Note
Patient is currently smoking 14 cpd and using the patches and gum with no negative side effects at this time. Patient has not used the patches for a few days but plans on starting again tomorrow. We discussed session 2 material including health and nicotine withdrawal symptoms. Patient has a follow up in 2 weeks.

## 2024-02-26 ENCOUNTER — TELEPHONE (OUTPATIENT)
Dept: HEMATOLOGY/ONCOLOGY | Facility: CLINIC | Age: 59
End: 2024-02-26
Payer: COMMERCIAL

## 2024-02-29 ENCOUNTER — CLINICAL SUPPORT (OUTPATIENT)
Dept: SMOKING CESSATION | Facility: CLINIC | Age: 59
End: 2024-02-29

## 2024-02-29 ENCOUNTER — HOSPITAL ENCOUNTER (OUTPATIENT)
Dept: RADIOLOGY | Facility: HOSPITAL | Age: 59
Discharge: HOME | End: 2024-02-29
Attending: INTERNAL MEDICINE
Payer: COMMERCIAL

## 2024-02-29 DIAGNOSIS — F17.200 TOBACCO USE DISORDER: Primary | ICD-10-CM

## 2024-02-29 DIAGNOSIS — C50.912 INVASIVE LOBULAR CARCINOMA OF LEFT BREAST IN FEMALE: ICD-10-CM

## 2024-02-29 PROCEDURE — 77080 DXA BONE DENSITY AXIAL: CPT | Mod: TC,PO

## 2024-02-29 PROCEDURE — 77080 DXA BONE DENSITY AXIAL: CPT | Mod: 26,,, | Performed by: RADIOLOGY

## 2024-02-29 PROCEDURE — 99407 BEHAV CHNG SMOKING > 10 MIN: CPT | Mod: S$GLB,,, | Performed by: GENERAL PRACTICE

## 2024-02-29 PROCEDURE — 99999 PR PBB SHADOW E&M-EST. PATIENT-LVL II: CPT | Mod: PBBFAC,,,

## 2024-03-06 ENCOUNTER — CLINICAL SUPPORT (OUTPATIENT)
Dept: SMOKING CESSATION | Facility: CLINIC | Age: 59
End: 2024-03-06

## 2024-03-06 DIAGNOSIS — F17.200 TOBACCO USE DISORDER: Primary | ICD-10-CM

## 2024-03-06 PROCEDURE — 99999 PR PBB SHADOW E&M-EST. PATIENT-LVL III: CPT | Mod: PBBFAC,,,

## 2024-03-06 RX ORDER — VARENICLINE TARTRATE 1 MG/1
TABLET, FILM COATED ORAL
Qty: 58 TABLET | Refills: 0 | Status: SHIPPED | OUTPATIENT
Start: 2024-03-06

## 2024-03-06 RX ORDER — IBUPROFEN 200 MG
1 TABLET ORAL DAILY
Qty: 14 PATCH | Refills: 0 | Status: SHIPPED | OUTPATIENT
Start: 2024-03-06

## 2024-03-06 NOTE — PROGRESS NOTES
Individual Follow-Up Form    3/6/2024    Quit Date:     Clinical Status of Patient: Outpatient    Length of Service: 60 minutes    Continuing Medication: yes  Patches    Other Medications: gum     Target Symptoms: Withdrawal and medication side effects. The following were  rated moderate (3) to severe (4) on TCRS:  Moderate (3): desire or crave;discussed with patient  Severe (4): none    Comments: Patient is currently smoking 12 cpd and using patches and gum with no negative side effects at this time. We discussed ambivalence and session 3 material including managing habitual behaviors and health. We also discussed adding varenicline to help with her quit. Patient is currently taking a medication that has possible interactions with Wellbutrin. Patient also states she did not like Wellbutrin as it made her very agitated.  Patient has a follow up in 2 weeks.    Diagnosis: F17.200    Next Visit: 2 weeks

## 2024-03-06 NOTE — Clinical Note
Patient is currently smoking 12 cpd and using patches and gum with no negative side effects at this time. We discussed ambivalence and session 3 material including managing habitual behaviors and health. We also discussed adding varenicline to help with her quit. Patient is currently taking a medication that has possible interactions with Wellbutrin. Patient also states she did not like Wellbutrin as it made her very agitated.  Patient has a follow up in 2 weeks.

## 2024-03-08 ENCOUNTER — TELEPHONE (OUTPATIENT)
Dept: HEMATOLOGY/ONCOLOGY | Facility: CLINIC | Age: 59
End: 2024-03-08
Payer: COMMERCIAL

## 2024-03-15 DIAGNOSIS — C50.912 INVASIVE LOBULAR CARCINOMA OF LEFT BREAST IN FEMALE: ICD-10-CM

## 2024-03-15 RX ORDER — ANASTROZOLE 1 MG/1
1 TABLET ORAL
Qty: 90 TABLET | Refills: 3 | Status: SHIPPED | OUTPATIENT
Start: 2024-03-15 | End: 2024-05-06

## 2024-03-21 ENCOUNTER — TELEPHONE (OUTPATIENT)
Dept: SMOKING CESSATION | Facility: CLINIC | Age: 59
End: 2024-03-21
Payer: COMMERCIAL

## 2024-04-01 ENCOUNTER — TELEPHONE (OUTPATIENT)
Dept: HEMATOLOGY/ONCOLOGY | Facility: CLINIC | Age: 59
End: 2024-04-01
Payer: COMMERCIAL

## 2024-04-22 ENCOUNTER — CLINICAL SUPPORT (OUTPATIENT)
Dept: SMOKING CESSATION | Facility: CLINIC | Age: 59
End: 2024-04-22
Payer: COMMERCIAL

## 2024-04-22 DIAGNOSIS — F17.200 NICOTINE DEPENDENCE: Primary | ICD-10-CM

## 2024-04-22 PROCEDURE — 99407 BEHAV CHNG SMOKING > 10 MIN: CPT | Mod: S$GLB,,,

## 2024-04-22 PROCEDURE — 99999 PR PBB SHADOW E&M-EST. PATIENT-LVL I: CPT | Mod: PBBFAC,,,

## 2024-05-06 ENCOUNTER — OFFICE VISIT (OUTPATIENT)
Dept: HEMATOLOGY/ONCOLOGY | Facility: CLINIC | Age: 59
End: 2024-05-06
Payer: COMMERCIAL

## 2024-05-06 VITALS
OXYGEN SATURATION: 93 % | DIASTOLIC BLOOD PRESSURE: 90 MMHG | WEIGHT: 120.81 LBS | TEMPERATURE: 97 F | BODY MASS INDEX: 19.21 KG/M2 | SYSTOLIC BLOOD PRESSURE: 140 MMHG | HEART RATE: 84 BPM

## 2024-05-06 DIAGNOSIS — N95.1 HOT FLASHES DUE TO MENOPAUSE: ICD-10-CM

## 2024-05-06 DIAGNOSIS — C50.912 INVASIVE LOBULAR CARCINOMA OF LEFT BREAST IN FEMALE: ICD-10-CM

## 2024-05-06 DIAGNOSIS — C50.412 MALIGNANT NEOPLASM OF UPPER-OUTER QUADRANT OF LEFT BREAST IN FEMALE, ESTROGEN RECEPTOR POSITIVE: ICD-10-CM

## 2024-05-06 DIAGNOSIS — Z17.0 MALIGNANT NEOPLASM OF UPPER-OUTER QUADRANT OF LEFT BREAST IN FEMALE, ESTROGEN RECEPTOR POSITIVE: ICD-10-CM

## 2024-05-06 DIAGNOSIS — N64.4 BREAST PAIN, LEFT: Primary | ICD-10-CM

## 2024-05-06 DIAGNOSIS — T45.1X5A HOT FLASHES RELATED TO AROMATASE INHIBITOR THERAPY: ICD-10-CM

## 2024-05-06 DIAGNOSIS — R45.86 MOOD SWINGS: ICD-10-CM

## 2024-05-06 DIAGNOSIS — R23.2 HOT FLASHES RELATED TO AROMATASE INHIBITOR THERAPY: ICD-10-CM

## 2024-05-06 DIAGNOSIS — M81.0 AGE-RELATED OSTEOPOROSIS WITHOUT CURRENT PATHOLOGICAL FRACTURE: ICD-10-CM

## 2024-05-06 DIAGNOSIS — F17.200 TOBACCO USE DISORDER: ICD-10-CM

## 2024-05-06 PROCEDURE — 99999 PR PBB SHADOW E&M-EST. PATIENT-LVL IV: CPT | Mod: PBBFAC,,, | Performed by: INTERNAL MEDICINE

## 2024-05-06 PROCEDURE — 99215 OFFICE O/P EST HI 40 MIN: CPT | Mod: S$GLB,,, | Performed by: INTERNAL MEDICINE

## 2024-05-06 RX ORDER — VENLAFAXINE HYDROCHLORIDE 75 MG/1
75 CAPSULE, EXTENDED RELEASE ORAL DAILY
Qty: 90 CAPSULE | Refills: 3 | Status: SHIPPED | OUTPATIENT
Start: 2024-05-06 | End: 2024-08-04

## 2024-05-06 RX ORDER — LETROZOLE 2.5 MG/1
2.5 TABLET, FILM COATED ORAL DAILY
Qty: 90 TABLET | Refills: 3 | Status: SHIPPED | OUTPATIENT
Start: 2024-05-06 | End: 2025-05-06

## 2024-05-06 RX ORDER — VENLAFAXINE HYDROCHLORIDE 37.5 MG/1
37.5 CAPSULE, EXTENDED RELEASE ORAL DAILY
Qty: 7 CAPSULE | Refills: 0 | Status: SHIPPED | OUTPATIENT
Start: 2024-05-06 | End: 2024-05-13

## 2024-05-06 NOTE — PROGRESS NOTES
Name: Sheri Garces  MRN:  [id number]  :  1965 Age 59 y.o.  Date of Service: 2024    Reason for visit:  Sheri Garces is a 59 y.o. female here regarding...    # invasive lobular carcinoma of the left breast  Date of Original Diagnosis: 10/24/23  Original Stage: Stage 1A pTbpN0(sn) ER 96% [place] negative Her2  1+/negative; Ki67 2.1%  Current Sites of Disease: none  Current Goals of Therapy: curative   Current Therapy:  Anastrozole (start 2024)    Oncologic History/History of Present Illness:   10/20/24 mammogram with ultrasound-There is a 5 mm x 3 mm x 3 mm irregularly shaped, hypoechoic mass with indistinct margins seen in the left breast   2023 lumpectomy, left sentinel node, pTbpN0(sn)   24 completed XRT    Interval history- reports intense displeasure with side effects of anastrozole, reports mood swings and an inability to comply with the anastrozole due to side effects  PHYSICAL EXAMINATION:  There were no vitals taken for this visit.  Wt Readings from Last 3 Encounters:   24 52.3 kg (115 lb 4.8 oz)   24 52.3 kg (115 lb 4.8 oz)   24 52.3 kg (115 lb 4.8 oz)     ECOG PERFORMANCE STATUS: 1  Physical Exam   General:  Well-appearing, nontoxic  Eyes:  Equal and round pupils, EOMI, no scleral icterus  Mouth:  No lesions, moist  Cardiovascular:  Warm, well-perfused, no peripheral edema  Lungs:  Unlabored on room air, no wheezing  Neurologic:  Awake, alert and oriented, participating in the exam  Psych:  Appropriate mood and affect  Skin:  Normal pallor, No rashes  Heme:  No petechiae, no purpura  Breasts: breasts appear normal, no suspicious masses, no skin or nipple changes or axillary nodes, surgical scars noted .     LABORATORY:  CBC  Lab Results   Component Value Date    WBC 9.08 2024    HGB 15.3 2024    HCT 44.6 2024    MCV 94 2024     2024         BMP  Lab Results   Component Value Date     (L) 2024    K 4.2 2024     CL 99 02/29/2024    CO2 22 (L) 02/29/2024    BUN 11 02/29/2024    CREATININE 0.7 02/29/2024    CALCIUM 10.1 02/29/2024    ANIONGAP 13 02/29/2024    ESTGFRAFRICA >60 04/10/2021    EGFRNONAA >60 04/10/2021         PATHOLOGY:  LEFT BREAST: INVASIVE LOBULAR CARCINOMA   GRADE: LOW     ER: POSITIVE, UT: POSITIVE, HER2**: NEGATIVE       RADIOLOGY:        ASSESSMENT AND PLAN:  Sheri Garces is a 59 y.o. female with...    # invasive lobular carcinoma of the left breast  Date of Original Diagnosis: 10/24/23  Original Stage: Stage 1A pTbpN0(sn) ER 96% [place] negative Her2  1+/negative; Ki67 2.1%  Current Sites of Disease: none  Current Goals of Therapy: curative   Current Therapy:  Anastrozole (start March 2024)    # mood changes due to aromatase inhibitor- reports mood swings and an inability to comply with the anastrozole due to side effects.  Changing to letrozole.  Weaning off Zoloft and starting Effexor.     # smoker-using Nicorette gum, patches and Chantix    #[facility]  -patient will be on endocrine therapy x 5 years, baseline DEXA scan 2/29/24 with osteoporosis, we will discuss denosumab at next appointment and provide dental clearance form, did not address this visit given problems with anastrozole and multiple medication changes    #Lipids- at risk for hyperlipidemia while on endocrine therapy; advise to continue routine PCP visits with lipid checks, already on statin    Ivet Gilmore M.D.  Hematology/Oncology     Route Chart for Scheduling    Med Onc Chart Routing      Follow up with physician . 7 weeks   Follow up with DEAN    Infusion scheduling note    Injection scheduling note    Labs    Imaging   US breast at [facility] in 2 weeks   Pharmacy appointment    Other referrals

## 2024-05-07 ENCOUNTER — TELEPHONE (OUTPATIENT)
Dept: HEMATOLOGY/ONCOLOGY | Facility: CLINIC | Age: 59
End: 2024-05-07
Payer: COMMERCIAL

## 2024-05-07 DIAGNOSIS — C50.412 MALIGNANT NEOPLASM OF UPPER-OUTER QUADRANT OF LEFT BREAST IN FEMALE, ESTROGEN RECEPTOR POSITIVE: ICD-10-CM

## 2024-05-07 DIAGNOSIS — N64.4 BREAST PAIN, LEFT: Primary | ICD-10-CM

## 2024-05-07 DIAGNOSIS — Z17.0 MALIGNANT NEOPLASM OF UPPER-OUTER QUADRANT OF LEFT BREAST IN FEMALE, ESTROGEN RECEPTOR POSITIVE: ICD-10-CM

## 2024-05-07 PROBLEM — M81.0 AGE-RELATED OSTEOPOROSIS WITHOUT CURRENT PATHOLOGICAL FRACTURE: Status: ACTIVE | Noted: 2024-05-07

## 2024-05-07 PROBLEM — R45.86 MOOD SWINGS: Status: ACTIVE | Noted: 2024-05-07

## 2024-05-07 PROBLEM — R23.2 HOT FLASHES RELATED TO AROMATASE INHIBITOR THERAPY: Status: ACTIVE | Noted: 2024-05-07

## 2024-05-07 PROBLEM — T45.1X5A HOT FLASHES RELATED TO AROMATASE INHIBITOR THERAPY: Status: ACTIVE | Noted: 2024-05-07

## 2024-05-07 NOTE — TELEPHONE ENCOUNTER
----- Message from Olivia Sequeira RN sent at 5/7/2024  8:56 AM CDT -----  Regarding: RE: orders needed  Yes, please. Then she'll be done until the following year.  ----- Message -----  From: Tena Jolly MA  Sent: 5/7/2024   8:23 AM CDT  To: Olivia Sequeira RN  Subject: RE: orders needed                                Do you want bilateral diagnostic? She is not due for a screening until October.  ----- Message -----  From: Olivia Sequeira RN  Sent: 5/7/2024   7:51 AM CDT  To: Deirdre STARKS Staff  Subject: orders needed                                    Please change mammogram order to bilateral. Per our protocol, it's been too long to just do one side since her last mammogram. I'll watch for order and get her scheduled ASAP.  Thanks,  Chanda Sequeira RN  [facility]

## 2024-05-07 NOTE — TELEPHONE ENCOUNTER
----- Message from Olivia Sequeira RN sent at 5/7/2024  7:49 AM CDT -----  Regarding: orders needed  Please change mammogram order to bilateral. Per our protocol, it's been too long to just do one side since her last mammogram. I'll watch for order and get her scheduled ASAP.  Thanks,  Chanda Sequeira RN  [facility]

## 2024-05-13 ENCOUNTER — TELEPHONE (OUTPATIENT)
Dept: HEMATOLOGY/ONCOLOGY | Facility: CLINIC | Age: 59
End: 2024-05-13
Payer: COMMERCIAL

## 2024-05-13 NOTE — TELEPHONE ENCOUNTER
Patient advised to take the effexor at night for the next few days and see if that helps with the blurry vision and to hold off taking the letrozole until we know for sure. Patient willing to try this and will let me know if anything worsens.

## 2024-05-13 NOTE — TELEPHONE ENCOUNTER
----- Message from Christine Isidro sent at 5/13/2024  1:48 PM CDT -----  Regarding: advice  Contact: patient  Type: Needs Medical Advice  Who Called:  patient  Symptoms (please be specific):    How long has patient had these symptoms:    Pharmacy name and phone #:    [facility] #[id number] - [facility] [address] [address] AT [facility] [address] [facility] [address] [place] [id number]  Phone: [phone] Fax: [phone]    Best Call Back Number: [phone] (home)     Additional Information: Patient states Effexor is making her vision messed up.  Please call patient to advise.  Thanks!

## 2024-07-15 ENCOUNTER — OFFICE VISIT (OUTPATIENT)
Dept: HEMATOLOGY/ONCOLOGY | Facility: CLINIC | Age: 59
End: 2024-07-15
Payer: COMMERCIAL

## 2024-07-15 VITALS
RESPIRATION RATE: 17 BRPM | DIASTOLIC BLOOD PRESSURE: 98 MMHG | HEIGHT: 67 IN | SYSTOLIC BLOOD PRESSURE: 127 MMHG | OXYGEN SATURATION: 98 % | WEIGHT: 118.81 LBS | BODY MASS INDEX: 18.65 KG/M2 | TEMPERATURE: 98 F | HEART RATE: 103 BPM

## 2024-07-15 DIAGNOSIS — F17.200 TOBACCO USE DISORDER: ICD-10-CM

## 2024-07-15 DIAGNOSIS — C50.912 INVASIVE LOBULAR CARCINOMA OF LEFT BREAST IN FEMALE: ICD-10-CM

## 2024-07-15 DIAGNOSIS — R45.86 MOOD SWINGS: ICD-10-CM

## 2024-07-15 DIAGNOSIS — Z17.0 MALIGNANT NEOPLASM OF UPPER-OUTER QUADRANT OF LEFT BREAST IN FEMALE, ESTROGEN RECEPTOR POSITIVE: Primary | ICD-10-CM

## 2024-07-15 DIAGNOSIS — M81.0 AGE-RELATED OSTEOPOROSIS WITHOUT CURRENT PATHOLOGICAL FRACTURE: ICD-10-CM

## 2024-07-15 DIAGNOSIS — T45.1X5A HOT FLASHES RELATED TO AROMATASE INHIBITOR THERAPY: ICD-10-CM

## 2024-07-15 DIAGNOSIS — R23.2 HOT FLASHES RELATED TO AROMATASE INHIBITOR THERAPY: ICD-10-CM

## 2024-07-15 DIAGNOSIS — C50.412 MALIGNANT NEOPLASM OF UPPER-OUTER QUADRANT OF LEFT BREAST IN FEMALE, ESTROGEN RECEPTOR POSITIVE: Primary | ICD-10-CM

## 2024-07-15 PROCEDURE — G2211 COMPLEX E/M VISIT ADD ON: HCPCS | Mod: S$GLB,,, | Performed by: INTERNAL MEDICINE

## 2024-07-15 PROCEDURE — 99215 OFFICE O/P EST HI 40 MIN: CPT | Mod: S$GLB,,, | Performed by: INTERNAL MEDICINE

## 2024-07-15 PROCEDURE — 99999 PR PBB SHADOW E&M-EST. PATIENT-LVL IV: CPT | Mod: PBBFAC,,, | Performed by: INTERNAL MEDICINE

## 2024-07-15 RX ORDER — TAMOXIFEN CITRATE 20 MG/1
20 TABLET ORAL DAILY
Qty: 90 TABLET | Refills: 3 | Status: SHIPPED | OUTPATIENT
Start: 2024-07-15 | End: 2025-07-15

## 2024-07-15 NOTE — PROGRESS NOTES
Name: Sheri Garces  MRN:  [id number]  :  1965 Age 59 y.o.  Date of Service: 7/15/2024    Reason for visit:  Sheri Garces is a 59 y.o. female here regarding...    # invasive lobular carcinoma of the left breast  Date of Original Diagnosis: 10/24/23  Original Stage: Stage 1A pTbpN0(sn) ER 96% [place] negative Her2  1+/negative; Ki67 2.1%  Current Sites of Disease: none  Current Goals of Therapy: curative   Current Therapy:  Tamoxifen (start pending 24)    Oncologic History/History of Present Illness:   10/20/24 mammogram with ultrasound-There is a 5 mm x 3 mm x 3 mm irregularly shaped, hypoechoic mass with indistinct margins seen in the left breast   2023 lumpectomy, left sentinel node, pTbpN0(sn)   24 completed XRT    3/2023-7/15/24--aromatase inhibitor therapy     Interval history- reports intense displeasure with side effects of letrozole, ongoing hot flashes, reports dizziness as well.  Not doing well and ready to switch to another agent     PHYSICAL EXAMINATION:  There were no vitals taken for this visit.  Wt Readings from Last 3 Encounters:   24 54.8 kg (120 lb 13 oz)   24 52.3 kg (115 lb 4.8 oz)   24 52.3 kg (115 lb 4.8 oz)     ECOG PERFORMANCE STATUS: 1  Physical Exam   General:  Well-appearing, nontoxic  Eyes:  Equal and round pupils, EOMI, no scleral icterus  Mouth:  No lesions, moist  Cardiovascular:  Warm, well-perfused, no peripheral edema  Lungs:  Unlabored on room air, no wheezing  Neurologic:  Awake, alert and oriented, participating in the exam  Psych:  Appropriate mood and affect  Skin:  Normal pallor, No rashes  Heme:  No petechiae, no purpura  Breasts: breasts appear normal, no suspicious masses, no skin or nipple changes or axillary nodes, surgical scars noted along w/ RT skin changes      LABORATORY:  CBC  Lab Results   Component Value Date    WBC 9.08 2024    HGB 15.3 2024    HCT 44.6 2024    MCV 94 2024     2024          BMP  Lab Results   Component Value Date     (L) 02/29/2024    K 4.2 02/29/2024    CL 99 02/29/2024    CO2 22 (L) 02/29/2024    BUN 11 02/29/2024    CREATININE 0.7 02/29/2024    CALCIUM 10.1 02/29/2024    ANIONGAP 13 02/29/2024    ESTGFRAFRICA >60 04/10/2021    EGFRNONAA >60 04/10/2021         PATHOLOGY:  LEFT BREAST: INVASIVE LOBULAR CARCINOMA   GRADE: LOW     ER: POSITIVE, MA: POSITIVE, HER2**: NEGATIVE       RADIOLOGY:        ASSESSMENT AND PLAN:  Sheri Garces is a 59 y.o. female with...    # invasive lobular carcinoma of the left breast  Date of Original Diagnosis: 10/24/23  Original Stage: Stage 1A pTbpN0(sn) ER 96% [place] negative Her2  1+/negative; Ki67 2.1%  Current Sites of Disease: none  Current Goals of Therapy: curative   Current Therapy:  Tamoxifen (start pending 7/22/24)    3/2023-7/15/24--aromatase inhibitor therapy   reports intense displeasure with side effects of AI, ongoing hot flashes, reports dizziness as well.  Switching to Tamoxifen 7/15/24. I counseled the patient regarding the possible side effects of Tamoxifen including hot flashes, thromboembolism, and uterine cancer. Counseled patient to continue routine annual GYN visits.     # mood changes due to aromatase inhibitor- reports mood swings and an inability to comply with the anastrozole and lletrozole due to side effects.  Mildly better with Effexor, switching to Tamoxifen     # smoker-using Nicorette gum, patches and Chantix    #[facility] Health  -patient will be on endocrine therapy x 5 years, baseline DEXA scan 2/29/24 with osteoporosis, we will discuss denosumab at next appointment and provide dental clearance form, did not address this visit given problems with letrozole and multiple medication changes    #Lipids- at risk for hyperlipidemia while on endocrine therapy; advise to continue routine PCP visits with lipid checks, already on statin    Ivet Gilmore M.D.  Hematology/Oncology     Route Chart for Scheduling    Med Onc  Chart Routing      Follow up with physician . 7 weeks, no labs   Follow up with DEAN    Infusion scheduling note    Injection scheduling note    Labs   Scheduling:  Preferred lab:  Lab interval:  No labs   Imaging    Pharmacy appointment    Other referrals

## 2024-07-22 ENCOUNTER — CLINICAL SUPPORT (OUTPATIENT)
Dept: SMOKING CESSATION | Facility: CLINIC | Age: 59
End: 2024-07-22

## 2024-07-22 DIAGNOSIS — F17.200 NICOTINE DEPENDENCE: Primary | ICD-10-CM

## 2024-07-22 DIAGNOSIS — U07.1 COVID-19: Primary | ICD-10-CM

## 2024-07-22 PROCEDURE — 99999 PR PBB SHADOW E&M-EST. PATIENT-LVL I: CPT | Mod: PBBFAC,,,

## 2024-07-22 RX ORDER — NIRMATRELVIR AND RITONAVIR 300-100 MG
KIT ORAL
Qty: 30 TABLET | Refills: 0 | Status: SHIPPED | OUTPATIENT
Start: 2024-07-22 | End: 2024-07-27

## 2024-07-22 NOTE — PROGRESS NOTES
Spoke with patient today in regard to smoking cessation progress for 6 month telephone follow up, she states not tobacco free.  Patient states she currently has COVID and has not quit smoking. Patient states she is not interested in scheduling for the program at this time.  Patient states she will contact me at a later time to schedule an appointment.  Informed patient of benefit period, future follow up, and contact information if any further help or support is needed.  Will complete smart form for 6 month follow up on Quit attempt #1.

## 2024-07-23 ENCOUNTER — TELEPHONE (OUTPATIENT)
Dept: HEMATOLOGY/ONCOLOGY | Facility: CLINIC | Age: 59
End: 2024-07-23
Payer: COMMERCIAL

## 2024-07-23 NOTE — TELEPHONE ENCOUNTER
----- Message from Drea Hall sent at 7/23/2024 12:53 PM CDT -----  Type: Needs Medical Advice  Who Called:  Patient   Symptoms (please be specific):    How long has patient had these symptoms:    Pharmacy name and phone #:    Best Call Back Number: [phone]  Additional Information: Patient is requesting a call back from the nurse on approval  from Dr. Gilmore to get a decadron shot for bursitis.

## 2024-08-07 ENCOUNTER — TELEPHONE (OUTPATIENT)
Dept: HEMATOLOGY/ONCOLOGY | Facility: CLINIC | Age: 59
End: 2024-08-07
Payer: COMMERCIAL

## 2024-08-07 DIAGNOSIS — N95.1 HOT FLASHES DUE TO MENOPAUSE: ICD-10-CM

## 2024-08-07 RX ORDER — VENLAFAXINE HYDROCHLORIDE 75 MG/1
75 CAPSULE, EXTENDED RELEASE ORAL DAILY
Qty: 90 CAPSULE | Refills: 3 | Status: SHIPPED | OUTPATIENT
Start: 2024-08-07 | End: 2025-08-02

## 2024-08-08 DIAGNOSIS — R11.0 NAUSEA: Primary | ICD-10-CM

## 2024-08-08 RX ORDER — ONDANSETRON HYDROCHLORIDE 8 MG/1
8 TABLET, FILM COATED ORAL EVERY 8 HOURS PRN
Qty: 30 TABLET | Refills: 5 | Status: SHIPPED | OUTPATIENT
Start: 2024-08-08

## 2025-01-30 ENCOUNTER — TELEPHONE (OUTPATIENT)
Dept: SMOKING CESSATION | Facility: CLINIC | Age: 60
End: 2025-01-30
Payer: COMMERCIAL

## 2025-01-30 NOTE — TELEPHONE ENCOUNTER
Called patient about 12 month follow up. Left message for patient to call [phone]. Resolved quit episode.

## 2025-02-27 ENCOUNTER — TELEPHONE (OUTPATIENT)
Dept: CARDIOLOGY | Facility: CLINIC | Age: 60
End: 2025-02-27
Payer: COMMERCIAL

## 2025-04-15 ENCOUNTER — OFFICE VISIT (OUTPATIENT)
Dept: CARDIOLOGY | Facility: CLINIC | Age: 60
End: 2025-04-15
Payer: COMMERCIAL

## 2025-04-15 VITALS
HEART RATE: 96 BPM | WEIGHT: 112.88 LBS | SYSTOLIC BLOOD PRESSURE: 110 MMHG | DIASTOLIC BLOOD PRESSURE: 74 MMHG | HEIGHT: 66 IN | BODY MASS INDEX: 18.14 KG/M2

## 2025-04-15 DIAGNOSIS — R23.2 HOT FLASHES RELATED TO AROMATASE INHIBITOR THERAPY: Primary | ICD-10-CM

## 2025-04-15 DIAGNOSIS — F17.200 TOBACCO USE DISORDER: ICD-10-CM

## 2025-04-15 DIAGNOSIS — I71.40 ABDOMINAL ANEURYSM: ICD-10-CM

## 2025-04-15 DIAGNOSIS — T45.1X5A HOT FLASHES RELATED TO AROMATASE INHIBITOR THERAPY: Primary | ICD-10-CM

## 2025-04-15 DIAGNOSIS — R06.00 DYSPNEA, UNSPECIFIED TYPE: ICD-10-CM

## 2025-04-15 PROCEDURE — 99204 OFFICE O/P NEW MOD 45 MIN: CPT | Mod: S$GLB,,, | Performed by: INTERNAL MEDICINE

## 2025-04-15 PROCEDURE — 99999 PR PBB SHADOW E&M-EST. PATIENT-LVL III: CPT | Mod: PBBFAC,,, | Performed by: INTERNAL MEDICINE

## 2025-04-15 PROCEDURE — 93005 ELECTROCARDIOGRAM TRACING: CPT | Mod: PO

## 2025-04-15 NOTE — PROGRESS NOTES
Subjective:    Patient ID:  Sheri Garces is a 60 y.o. female patient here for evaluation Establish Care      History of Present Illness:  Cardiac new patient evaluation.  Atherosclerotic cardiovascular disease.  Recent abdominal ultrasound with focal dilatation ectasia of the distal abdominal aorta, 2.5 x 2.4 cm.  No past documented ischemic structural arrhythmic heart disease    Risk factors include ongoing tobacco use, dyslipidemia.  Last noninvasive cardiac assessment 2020.  Symptomatic with dyspnea, both at rest with exertional activities nonprogressive.    Remote history of breast cancer status post lumpectomy, radiation therapy.    Risk factors include hypertension well controlled with losartan 25 mg daily.  Dyslipidemia patient on statin agent.  Ongoing tobacco use, 1 pack cigarettes per day.             Review of patient's allergies indicates:   Allergen Reactions    Demerol [meperidine] Other (See Comments)     Hallucinations and combative       Past Medical History:   Diagnosis Date    Anxiety     Breast cancer 10/2023    Bursitis of both hips     Chronic back pain     Chronic leg pain     left    Depression     Hyperlipidemia     Hypertension     Insomnia     Miscarriage     Sleep apnea     has not been using    Tinnitus      Past Surgical History:   Procedure Laterality Date    BACK SURGERY      17 years ago    BREAST BIOPSY      COLONOSCOPY N/A 09/19/2019    Procedure: COLONOSCOPY;  Surgeon: Andrea Watt MD;  Location: [facility];  Service: Endoscopy;  Laterality: N/A;    DILATION AND CURETTAGE OF UTERUS      LUMPECTOMY, WITH RADAR LOCALIZATION USING ZONIA  Left 11/28/2023    Procedure: LUMPECTOMY,WITH RADAR LOCALIZATION USING ZONIA ;  Surgeon: Akanksha Leon MD;  Location: [facility];  Service: General;  Laterality: Left;    SENTINEL LYMPH NODE BIOPSY Left 11/28/2023    Procedure: BIOPSY, LYMPH NODE, SENTINEL;  Surgeon: Akanksha Leon MD;  Location: [facility];  Service:  General;  Laterality: Left;     Social History[1]     Review of Systems:    As noted in HPI in addition         REVIEW OF SYSTEMS  Review of Systems   Constitutional: Negative for decreased appetite, diaphoresis, night sweats, weight gain and weight loss.   HENT:  Negative for nosebleeds and odynophagia.    Eyes:  Negative for double vision and photophobia.   Cardiovascular:  Negative for chest pain, claudication, cyanosis, dyspnea on exertion, irregular heartbeat, leg swelling, near-syncope, orthopnea, palpitations, paroxysmal nocturnal dyspnea and syncope.   Respiratory:  Negative for cough, hemoptysis, shortness of breath and wheezing.    Hematologic/Lymphatic: Negative for adenopathy.   Skin:  Negative for flushing, skin cancer and suspicious lesions.   Musculoskeletal:  Negative for gout, myalgias and neck pain.   Gastrointestinal:  Negative for abdominal pain, heartburn, hematemesis and hematochezia.   Genitourinary:  Negative for bladder incontinence, hesitancy and nocturia.   Neurological:  Negative for focal weakness, headaches, light-headedness and paresthesias.   Psychiatric/Behavioral:  Negative for memory loss and substance abuse.        Objective:        Vitals:    04/15/25 1448   BP: 110/74   Pulse: 96       Lab Results   Component Value Date    WBC 10.57 12/19/2024    HGB 16.1 (H) 12/19/2024    HCT 46.7 12/19/2024     (H) 12/19/2024    ALT 25 12/19/2024    AST 35 12/19/2024     12/19/2024    K 4.4 12/19/2024     12/19/2024    CREATININE 0.47 (L) 12/19/2024    BUN 9 12/19/2024    CO2 22 (L) 12/19/2024    INR 1.0 11/27/2023      CARDIOGRAM RESULTS  No results found for this or any previous visit.    No results found for this or any previous visit.          CURRENT/PREVIOUS VISIT EKG  Results for orders placed or performed during the hospital encounter of 04/10/21   EKG 12-lead    Collection Time: 04/10/21  8:52 AM    Narrative    Test Reason : R07.9,    Vent. Rate : 094 BPM      Atrial Rate : 094 BPM     P-R Int : 122 ms          QRS Dur : 074 ms      QT Int : 356 ms       P-R-T Axes : 075 052 079 degrees     QTc Int : 445 ms    Normal sinus rhythm  Biatrial enlargement  Abnormal ECG  When compared with ECG of 13-OCT-2020 15:07,  No significant change was found  Confirmed by Vargas FROST, Dale ([id number]) on 4/12/2021 9:03:01 AM    Referred By: [email or web address]   SELF           Confirmed By:Dale Kaye MD     No valid procedures specified.   No results found for this or any previous visit.    No valid procedures specified.        PHYSICAL EXAM  GENERAL:  no apparent distress alert and oriented.   HEENT: Normocephalic. Pupils normal and conjunctivae normal.  Mucous membranes normal, no cyanosis or icterus, trachea central,no pallor or icterus is noted..   NECK: No JVD. No bruit..   THYROID: Thyroid not enlarged. No nodules present..   CARDIAC:  Normal S1-S2.  No murmur rub click or gallop.  PMI nondisplaced.  LUNGS: Clear to auscultation. No wheezing or rhonchi..   ABDOMEN: Soft no masses or organomegaly.  No abdomen pulsations or bruits.  Normal bowel sounds. No pulsations and no masses felt, No guarding or rebound.   URINARY: No stokes catheter   EXTREMITIES: No cyanosis, clubbing or edema noted at this time., no calf tenderness bilaterally.   PERIPHERAL VASCULAR SYSTEM: Good palpable distal pulses.  2+ femoral, popliteal and pedal pulses.  No bruits    CENTRAL NERVOUS SYSTEM: No focal motor or sensory deficits noted.   SKIN: Skin without lesions, moist, well perfused.   MUSCLE STRENGTH & TONE: No noteable weakness, atrophy or abnormal movement.     I HAVE REVIEWED :    The vital signs, nurses notes, and all the pertinent radiology and labs.         Current Outpatient Medications   Medication Instructions    ALPRAZolam (XANAX) 0.25 mg, Oral, 2 times daily PRN    atorvastatin (LIPITOR) 40 mg, Nightly    diphenhydrAMINE (BENADRYL) 25 mg, Every 6 hours PRN    eszopiclone (LUNESTA) 3 mg, Nightly     HYDROcodone-acetaminophen (NORCO)  mg per tablet 1 tablet, Oral, Every 6 hours PRN    losartan (COZAAR) 25 mg, Daily    nicotine polacrilex (NICORETTE) 4 MG Gum CHEW up 8 pieces by mouth daily as needed    ondansetron (ZOFRAN) 8 mg, Oral, Every 8 hours PRN    tamoxifen (NOLVADEX) 20 mg, Oral, Daily    varenicline (CHANTIX) 1 mg Tab Take 1/2 tablet (0.5 mg total) by mouth every day for 3 days then 1/2 tablet (0.5 mg  total) 2 times daily for 4 days then 1 tablet (1 mg total) 2 times daily thereafter    venlafaxine (EFFEXOR-XR) 75 mg, Oral, Daily          Assessment:   Abnormal abdominal ultrasound with mild focal dilatation ectasia of the distal abdominal aorta, 2.5 x 2.4 cm.    Last noninvasive cardiac assessment 2020.  No Records.    Dyslipidemia, currently on Lipitor 40 daily    Hypertension, well controlled with losartan 25 daily    History of breast cancer.    Tobacco use.  One pack of cigarettes per day.        Plan:   Echo, carotid ultrasound.  Recent labs, December 2024 stable.    Call results.    Diet, exercise, tobacco cessation counseling.        No follow-ups on file.            [1]   Social History  Tobacco Use    Smoking status: Every Day     Current packs/day: 1.00     Average packs/day: 1 pack/day for 42.3 years (42.3 ttl pk-yrs)     Types: Cigarettes     Start date: 1983    Smokeless tobacco: Former   Substance Use Topics    Alcohol use: No    Drug use: Not Currently

## 2025-04-19 LAB
OHS QRS DURATION: 76 MS
OHS QTC CALCULATION: 444 MS

## 2025-04-29 ENCOUNTER — HOSPITAL ENCOUNTER (OUTPATIENT)
Dept: CARDIOLOGY | Facility: HOSPITAL | Age: 60
Discharge: HOME | End: 2025-04-29
Attending: INTERNAL MEDICINE
Payer: COMMERCIAL

## 2025-04-29 VITALS — BODY MASS INDEX: 18 KG/M2 | WEIGHT: 112 LBS | HEIGHT: 66 IN

## 2025-04-29 DIAGNOSIS — I71.40 ABDOMINAL ANEURYSM: ICD-10-CM

## 2025-04-29 DIAGNOSIS — F17.200 TOBACCO USE DISORDER: ICD-10-CM

## 2025-04-29 LAB
ASCENDING AORTA: 2.5 CM
AV INDEX (PROSTH): 0.64
AV MEAN GRADIENT: 5 MMHG
AV PEAK GRADIENT: 10 MMHG
AV VALVE AREA BY VELOCITY RATIO: 1.8 CM²
AV VALVE AREA: 1.8 CM²
AV VELOCITY RATIO: 0.63
BSA FOR ECHO PROCEDURE: 1.54 M2
CV ECHO LV RWT: 0.3 CM
DOP CALC AO PEAK VEL: 1.6 M/S
DOP CALC AO VTI: 32.2 CM
DOP CALC LVOT AREA: 2.8 CM2
DOP CALC LVOT DIAMETER: 1.9 CM
DOP CALC LVOT PEAK VEL: 1 M/S
DOP CALC LVOT STROKE VOLUME: 58.1 CM3
DOP CALCLVOT PEAK VEL VTI: 20.5 CM
E WAVE DECELERATION TIME: 271 MSEC
E/A RATIO: 0.85
E/E' RATIO: 8 M/S
ECHO LV POSTERIOR WALL: 0.7 CM (ref 0.6–1.1)
FRACTIONAL SHORTENING: 41.3 % (ref 28–44)
INTERVENTRICULAR SEPTUM: 0.7 CM (ref 0.6–1.1)
IVRT: 111 MSEC
LEFT ATRIUM AREA SYSTOLIC (APICAL 2 CHAMBER): 16.27 CM2
LEFT ATRIUM AREA SYSTOLIC (APICAL 4 CHAMBER): 11.67 CM2
LEFT ATRIUM SIZE: 3.2 CM
LEFT ATRIUM VOLUME INDEX MOD: 23 ML/M2
LEFT ATRIUM VOLUME MOD: 36 ML
LEFT CBA DIAS: 26 CM/S
LEFT CBA SYS: 68 CM/S
LEFT CCA DIST DIAS: 22 CM/S
LEFT CCA DIST SYS: 72 CM/S
LEFT CCA MID DIAS: 28 CM/S
LEFT CCA MID SYS: 80 CM/S
LEFT CCA PROX DIAS: 28 CM/S
LEFT CCA PROX SYS: 91 CM/S
LEFT ECA DIAS: 130 CM/S
LEFT ECA SYS: 445 CM/S
LEFT ICA DIST DIAS: 42 CM/S
LEFT ICA DIST SYS: 94 CM/S
LEFT ICA MID DIAS: 35 CM/S
LEFT ICA MID SYS: 81 CM/S
LEFT ICA PROX DIAS: 26 CM/S
LEFT ICA PROX SYS: 63 CM/S
LEFT INTERNAL DIMENSION IN SYSTOLE: 2.7 CM (ref 2.1–4)
LEFT VENTRICLE DIASTOLIC VOLUME INDEX: 62.82 ML/M2
LEFT VENTRICLE DIASTOLIC VOLUME: 98 ML
LEFT VENTRICLE END SYSTOLIC VOLUME APICAL 2 CHAMBER: 41.52 ML
LEFT VENTRICLE END SYSTOLIC VOLUME APICAL 4 CHAMBER: 26.06 ML
LEFT VENTRICLE MASS INDEX: 63.7 G/M2
LEFT VENTRICLE SYSTOLIC VOLUME INDEX: 17.9 ML/M2
LEFT VENTRICLE SYSTOLIC VOLUME: 28 ML
LEFT VENTRICULAR INTERNAL DIMENSION IN DIASTOLE: 4.6 CM (ref 3.5–6)
LEFT VENTRICULAR MASS: 99.3 G
LEFT VERTEBRAL DIAS: 22 CM/S
LEFT VERTEBRAL SYS: 61 CM/S
LV LATERAL E/E' RATIO: 6.9 M/S
LV SEPTAL E/E' RATIO: 9.9 M/S
LVED V (TEICH): 97.96 ML
LVES V (TEICH): 27.72 ML
LVOT MG: 1.94 MMHG
LVOT MV: 0.63 CM/S
MV PEAK A VEL: 0.81 M/S
MV PEAK E VEL: 0.69 M/S
MV STENOSIS PRESSURE HALF TIME: 78.72 MS
MV VALVE AREA P 1/2 METHOD: 2.79 CM2
OHS CV CAROTID RIGHT ICA EDV HIGHEST: 42
OHS CV CAROTID ULTRASOUND LEFT ICA/CCA RATIO: 1.31
OHS CV CAROTID ULTRASOUND RIGHT ICA/CCA RATIO: 1.31
OHS CV PV CAROTID LEFT HIGHEST CCA: 91
OHS CV PV CAROTID LEFT HIGHEST ICA: 94
OHS CV PV CAROTID RIGHT HIGHEST CCA: 95
OHS CV PV CAROTID RIGHT HIGHEST ICA: 102
OHS CV RV/LV RATIO: 0.59 CM
OHS CV US CAROTID LEFT HIGHEST EDV: 42
PISA TR MAX VEL: 2.2 M/S
PULM VEIN S/D RATIO: 1.55
PV PEAK D VEL: 0.64 M/S
PV PEAK S VEL: 0.99 M/S
RA PRESSURE ESTIMATED: 3 MMHG
RIGHT CBA DIAS: 22 CM/S
RIGHT CBA SYS: 71 CM/S
RIGHT CCA DIST DIAS: 25 CM/S
RIGHT CCA DIST SYS: 78 CM/S
RIGHT CCA MID DIAS: 33 CM/S
RIGHT CCA MID SYS: 88 CM/S
RIGHT CCA PROX DIAS: 35 CM/S
RIGHT CCA PROX SYS: 95 CM/S
RIGHT ECA DIAS: 44 CM/S
RIGHT ECA SYS: 149 CM/S
RIGHT ICA DIST DIAS: 42 CM/S
RIGHT ICA DIST SYS: 102 CM/S
RIGHT ICA MID DIAS: 42 CM/S
RIGHT ICA MID SYS: 95 CM/S
RIGHT ICA PROX DIAS: 28 CM/S
RIGHT ICA PROX SYS: 75 CM/S
RIGHT VENTRICLE DIASTOLIC BASEL DIMENSION: 2.7 CM
RIGHT VENTRICLE DIASTOLIC LENGTH: 5.1 CM
RIGHT VENTRICLE DIASTOLIC MID DIMENSION: 1.8 CM
RIGHT VENTRICULAR END-DIASTOLIC DIMENSION: 2.7 CM
RIGHT VENTRICULAR LENGTH IN DIASTOLE (APICAL 4-CHAMBER VIEW): 5.14 CM
RIGHT VERTEBRAL DIAS: 17 CM/S
RIGHT VERTEBRAL SYS: 43 CM/S
RV MID DIAMA: 1.77 CM
RV TB RVSP: 5 MMHG
RV TISSUE DOPPLER FREE WALL SYSTOLIC VELOCITY 1 (APICAL 4 CHAMBER VIEW): 11.64 CM/S
SINUS: 3 CM
STJ: 2.4 CM
TDI LATERAL: 0.1 M/S
TDI SEPTAL: 0.07 M/S
TDI: 0.09 M/S
TR MAX PG: 20 MMHG
TRICUSPID ANNULAR PLANE SYSTOLIC EXCURSION: 1.8 CM
TV REST PULMONARY ARTERY PRESSURE: 22 MMHG
Z-SCORE OF LEFT VENTRICULAR DIMENSION IN END DIASTOLE: 0.22
Z-SCORE OF LEFT VENTRICULAR DIMENSION IN END SYSTOLE: -0.26

## 2025-04-29 PROCEDURE — 93306 TTE W/DOPPLER COMPLETE: CPT | Mod: PO

## 2025-04-29 PROCEDURE — 93880 EXTRACRANIAL BILAT STUDY: CPT | Mod: 26,,, | Performed by: INTERNAL MEDICINE

## 2025-04-29 PROCEDURE — 93880 EXTRACRANIAL BILAT STUDY: CPT | Mod: PO

## 2025-05-05 ENCOUNTER — PATIENT MESSAGE (OUTPATIENT)
Dept: CARDIOLOGY | Facility: CLINIC | Age: 60
End: 2025-05-05
Payer: COMMERCIAL

## 2025-05-14 ENCOUNTER — HOSPITAL ENCOUNTER (OUTPATIENT)
Dept: RADIOLOGY | Facility: HOSPITAL | Age: 60
Discharge: HOME | End: 2025-05-14
Attending: NURSE PRACTITIONER
Payer: COMMERCIAL

## 2025-05-14 DIAGNOSIS — Z12.31 SCREENING MAMMOGRAM, ENCOUNTER FOR: ICD-10-CM

## 2025-05-14 PROCEDURE — 77063 BREAST TOMOSYNTHESIS BI: CPT | Mod: TC,PN

## 2025-05-14 PROCEDURE — 77063 BREAST TOMOSYNTHESIS BI: CPT | Mod: 26,,, | Performed by: RADIOLOGY

## 2025-05-14 PROCEDURE — 77067 SCR MAMMO BI INCL CAD: CPT | Mod: 26,,, | Performed by: RADIOLOGY
